# Patient Record
Sex: MALE | Race: WHITE | NOT HISPANIC OR LATINO | Employment: OTHER | URBAN - METROPOLITAN AREA
[De-identification: names, ages, dates, MRNs, and addresses within clinical notes are randomized per-mention and may not be internally consistent; named-entity substitution may affect disease eponyms.]

---

## 2021-03-17 ENCOUNTER — IMMUNIZATION (OUTPATIENT)
Dept: VACCINE CLINIC | Facility: HOSPITAL | Age: 66
End: 2021-03-17

## 2021-03-17 PROCEDURE — 91300 HC SARSCOV02 VAC 30MCG/0.3ML IM: CPT | Performed by: INTERNAL MEDICINE

## 2021-03-17 PROCEDURE — 0001A: CPT | Performed by: INTERNAL MEDICINE

## 2021-04-07 ENCOUNTER — IMMUNIZATION (OUTPATIENT)
Dept: VACCINE CLINIC | Facility: HOSPITAL | Age: 66
End: 2021-04-07

## 2021-04-07 PROCEDURE — 91300 HC SARSCOV02 VAC 30MCG/0.3ML IM: CPT | Performed by: INTERNAL MEDICINE

## 2021-04-07 PROCEDURE — 0002A: CPT | Performed by: INTERNAL MEDICINE

## 2021-06-15 ENCOUNTER — APPOINTMENT (OUTPATIENT)
Dept: CT IMAGING | Facility: HOSPITAL | Age: 66
End: 2021-06-15

## 2021-06-15 ENCOUNTER — HOSPITAL ENCOUNTER (INPATIENT)
Facility: HOSPITAL | Age: 66
LOS: 6 days | Discharge: HOME | End: 2021-06-21
Attending: EMERGENCY MEDICINE | Admitting: FAMILY MEDICINE

## 2021-06-15 DIAGNOSIS — J18.9 PNEUMONIA OF RIGHT LOWER LOBE DUE TO INFECTIOUS ORGANISM: Primary | ICD-10-CM

## 2021-06-15 DIAGNOSIS — J98.4 PULMONARY CAVITARY LESION: ICD-10-CM

## 2021-06-15 PROBLEM — R61 NIGHT SWEATS: Status: ACTIVE | Noted: 2021-06-15

## 2021-06-15 LAB
ALBUMIN SERPL-MCNC: 3.7 G/DL (ref 3.5–5.2)
ALBUMIN/GLOB SERPL: 0.8 G/DL
ALP SERPL-CCNC: 81 U/L (ref 39–117)
ALT SERPL W P-5'-P-CCNC: 20 U/L (ref 1–41)
ANION GAP SERPL CALCULATED.3IONS-SCNC: 14 MMOL/L (ref 5–15)
AST SERPL-CCNC: 31 U/L (ref 1–40)
BASOPHILS # BLD AUTO: 0.05 10*3/MM3 (ref 0–0.2)
BASOPHILS NFR BLD AUTO: 0.4 % (ref 0–1.5)
BILIRUB SERPL-MCNC: 0.3 MG/DL (ref 0–1.2)
BUN SERPL-MCNC: 15 MG/DL (ref 8–23)
BUN/CREAT SERPL: 17 (ref 7–25)
CALCIUM SPEC-SCNC: 9.9 MG/DL (ref 8.6–10.5)
CHLORIDE SERPL-SCNC: 97 MMOL/L (ref 98–107)
CO2 SERPL-SCNC: 22 MMOL/L (ref 22–29)
CREAT SERPL-MCNC: 0.88 MG/DL (ref 0.76–1.27)
D-LACTATE SERPL-SCNC: 1.2 MMOL/L (ref 0.5–2)
DEPRECATED RDW RBC AUTO: 50.9 FL (ref 37–54)
EOSINOPHIL # BLD AUTO: 0.05 10*3/MM3 (ref 0–0.4)
EOSINOPHIL NFR BLD AUTO: 0.4 % (ref 0.3–6.2)
ERYTHROCYTE [DISTWIDTH] IN BLOOD BY AUTOMATED COUNT: 14.1 % (ref 12.3–15.4)
FLUAV SUBTYP SPEC NAA+PROBE: NOT DETECTED
FLUBV RNA ISLT QL NAA+PROBE: NOT DETECTED
GFR SERPL CREATININE-BSD FRML MDRD: 87 ML/MIN/1.73
GLOBULIN UR ELPH-MCNC: 4.4 GM/DL
GLUCOSE SERPL-MCNC: 133 MG/DL (ref 65–99)
HCT VFR BLD AUTO: 39.7 % (ref 37.5–51)
HGB BLD-MCNC: 13 G/DL (ref 13–17.7)
HOLD SPECIMEN: NORMAL
IMM GRANULOCYTES # BLD AUTO: 0.05 10*3/MM3 (ref 0–0.05)
IMM GRANULOCYTES NFR BLD AUTO: 0.4 % (ref 0–0.5)
LYMPHOCYTES # BLD AUTO: 1.1 10*3/MM3 (ref 0.7–3.1)
LYMPHOCYTES NFR BLD AUTO: 9 % (ref 19.6–45.3)
MCH RBC QN AUTO: 31.9 PG (ref 26.6–33)
MCHC RBC AUTO-ENTMCNC: 32.7 G/DL (ref 31.5–35.7)
MCV RBC AUTO: 97.5 FL (ref 79–97)
MONOCYTES # BLD AUTO: 1.3 10*3/MM3 (ref 0.1–0.9)
MONOCYTES NFR BLD AUTO: 10.7 % (ref 5–12)
NEUTROPHILS NFR BLD AUTO: 79.1 % (ref 42.7–76)
NEUTROPHILS NFR BLD AUTO: 9.63 10*3/MM3 (ref 1.7–7)
NRBC BLD AUTO-RTO: 0 /100 WBC (ref 0–0.2)
NT-PROBNP SERPL-MCNC: 165.1 PG/ML (ref 0–900)
PLATELET # BLD AUTO: 490 10*3/MM3 (ref 140–450)
PMV BLD AUTO: 9.6 FL (ref 6–12)
POTASSIUM SERPL-SCNC: 4 MMOL/L (ref 3.5–5.2)
PROCALCITONIN SERPL-MCNC: 0.05 NG/ML (ref 0–0.25)
PROT SERPL-MCNC: 8.1 G/DL (ref 6–8.5)
RBC # BLD AUTO: 4.07 10*6/MM3 (ref 4.14–5.8)
SARS-COV-2 RNA PNL SPEC NAA+PROBE: NOT DETECTED
SODIUM SERPL-SCNC: 133 MMOL/L (ref 136–145)
TROPONIN T SERPL-MCNC: <0.01 NG/ML (ref 0–0.03)
WBC # BLD AUTO: 12.18 10*3/MM3 (ref 3.4–10.8)
WHOLE BLOOD HOLD SPECIMEN: NORMAL

## 2021-06-15 PROCEDURE — 80053 COMPREHEN METABOLIC PANEL: CPT

## 2021-06-15 PROCEDURE — 84484 ASSAY OF TROPONIN QUANT: CPT

## 2021-06-15 PROCEDURE — 87636 SARSCOV2 & INF A&B AMP PRB: CPT | Performed by: EMERGENCY MEDICINE

## 2021-06-15 PROCEDURE — 71275 CT ANGIOGRAPHY CHEST: CPT

## 2021-06-15 PROCEDURE — 85025 COMPLETE CBC W/AUTO DIFF WBC: CPT

## 2021-06-15 PROCEDURE — 83605 ASSAY OF LACTIC ACID: CPT

## 2021-06-15 PROCEDURE — U0004 COV-19 TEST NON-CDC HGH THRU: HCPCS | Performed by: NURSE PRACTITIONER

## 2021-06-15 PROCEDURE — 99223 1ST HOSP IP/OBS HIGH 75: CPT | Performed by: HOSPITALIST

## 2021-06-15 PROCEDURE — 99284 EMERGENCY DEPT VISIT MOD MDM: CPT

## 2021-06-15 PROCEDURE — 87040 BLOOD CULTURE FOR BACTERIA: CPT | Performed by: EMERGENCY MEDICINE

## 2021-06-15 PROCEDURE — 93005 ELECTROCARDIOGRAM TRACING: CPT

## 2021-06-15 PROCEDURE — 84145 PROCALCITONIN (PCT): CPT | Performed by: EMERGENCY MEDICINE

## 2021-06-15 PROCEDURE — 83880 ASSAY OF NATRIURETIC PEPTIDE: CPT

## 2021-06-15 PROCEDURE — 25010000002 CEFTRIAXONE PER 250 MG: Performed by: NURSE PRACTITIONER

## 2021-06-15 PROCEDURE — 0 IOPAMIDOL PER 1 ML: Performed by: EMERGENCY MEDICINE

## 2021-06-15 PROCEDURE — 25010000002 MEROPENEM PER 100 MG: Performed by: HOSPITALIST

## 2021-06-15 PROCEDURE — 93005 ELECTROCARDIOGRAM TRACING: CPT | Performed by: EMERGENCY MEDICINE

## 2021-06-15 RX ORDER — ONDANSETRON 2 MG/ML
4 INJECTION INTRAMUSCULAR; INTRAVENOUS EVERY 6 HOURS PRN
Status: DISCONTINUED | OUTPATIENT
Start: 2021-06-15 | End: 2021-06-21 | Stop reason: HOSPADM

## 2021-06-15 RX ORDER — ACETAMINOPHEN 325 MG/1
650 TABLET ORAL EVERY 6 HOURS PRN
Status: DISCONTINUED | OUTPATIENT
Start: 2021-06-15 | End: 2021-06-21 | Stop reason: HOSPADM

## 2021-06-15 RX ORDER — ONDANSETRON 4 MG/1
4 TABLET, FILM COATED ORAL EVERY 6 HOURS PRN
Status: DISCONTINUED | OUTPATIENT
Start: 2021-06-15 | End: 2021-06-21 | Stop reason: HOSPADM

## 2021-06-15 RX ORDER — SODIUM CHLORIDE 0.9 % (FLUSH) 0.9 %
10 SYRINGE (ML) INJECTION EVERY 12 HOURS SCHEDULED
Status: DISCONTINUED | OUTPATIENT
Start: 2021-06-15 | End: 2021-06-21 | Stop reason: HOSPADM

## 2021-06-15 RX ORDER — ACETAMINOPHEN 500 MG
1000 TABLET ORAL ONCE
Status: COMPLETED | OUTPATIENT
Start: 2021-06-15 | End: 2021-06-15

## 2021-06-15 RX ORDER — SACCHAROMYCES BOULARDII 250 MG
250 CAPSULE ORAL 2 TIMES DAILY
Status: DISCONTINUED | OUTPATIENT
Start: 2021-06-15 | End: 2021-06-21 | Stop reason: HOSPADM

## 2021-06-15 RX ORDER — SODIUM CHLORIDE 0.9 % (FLUSH) 0.9 %
10 SYRINGE (ML) INJECTION AS NEEDED
Status: DISCONTINUED | OUTPATIENT
Start: 2021-06-15 | End: 2021-06-21 | Stop reason: HOSPADM

## 2021-06-15 RX ORDER — GUAIFENESIN 600 MG/1
600 TABLET, EXTENDED RELEASE ORAL EVERY 12 HOURS SCHEDULED
Status: DISCONTINUED | OUTPATIENT
Start: 2021-06-15 | End: 2021-06-21 | Stop reason: HOSPADM

## 2021-06-15 RX ORDER — SODIUM CHLORIDE, SODIUM LACTATE, POTASSIUM CHLORIDE, CALCIUM CHLORIDE 600; 310; 30; 20 MG/100ML; MG/100ML; MG/100ML; MG/100ML
100 INJECTION, SOLUTION INTRAVENOUS CONTINUOUS
Status: DISCONTINUED | OUTPATIENT
Start: 2021-06-15 | End: 2021-06-16

## 2021-06-15 RX ADMIN — SODIUM CHLORIDE 1000 ML: 9 INJECTION, SOLUTION INTRAVENOUS at 18:19

## 2021-06-15 RX ADMIN — IOPAMIDOL 85 ML: 755 INJECTION, SOLUTION INTRAVENOUS at 18:58

## 2021-06-15 RX ADMIN — MEROPENEM 1 G: 1 INJECTION, POWDER, FOR SOLUTION INTRAVENOUS at 23:46

## 2021-06-15 RX ADMIN — ACETAMINOPHEN 1000 MG: 500 TABLET, FILM COATED ORAL at 18:15

## 2021-06-15 RX ADMIN — SODIUM CHLORIDE, POTASSIUM CHLORIDE, SODIUM LACTATE AND CALCIUM CHLORIDE 100 ML/HR: 600; 310; 30; 20 INJECTION, SOLUTION INTRAVENOUS at 23:30

## 2021-06-15 RX ADMIN — SODIUM CHLORIDE 1 G: 900 INJECTION INTRAVENOUS at 21:35

## 2021-06-15 RX ADMIN — SODIUM CHLORIDE, PRESERVATIVE FREE 10 ML: 5 INJECTION INTRAVENOUS at 23:47

## 2021-06-15 RX ADMIN — GUAIFENESIN 600 MG: 600 TABLET, EXTENDED RELEASE ORAL at 23:47

## 2021-06-15 RX ADMIN — Medication 250 MG: at 23:47

## 2021-06-15 NOTE — ED PROVIDER NOTES
Subjective   Shaun Tariq is a 65 yr old male that presents to the ER with complaints of shortness of breath.  Patient explains that his symptoms started approximately 4 days ago.  He experienced cough and congestion at that time.  Patient had been taking multiple over-the-counter medications including Mucinex.  Patient reports fever and chills and generalized malaise.  Patient advises as the time went on his symptoms became more severe.  Patient is here today with a temperature of 101.1.  Patient did receive both of his Covid vaccines.  Patient denies any sick contacts.      History provided by:  Patient   used: No    Shortness of Breath  Severity:  Moderate  Timing:  Constant  Progression:  Worsening  Context: activity, pollens and weather changes    Relieved by:  Nothing  Worsened by:  Deep breathing, coughing and activity  Associated symptoms: cough, fever and sputum production    Associated symptoms: no chest pain and no vomiting        Review of Systems   Constitutional: Positive for chills and fever.   HENT: Positive for congestion, rhinorrhea and sinus pressure.    Respiratory: Positive for cough, sputum production and shortness of breath.    Cardiovascular: Negative for chest pain.   Gastrointestinal: Negative for nausea and vomiting.   Genitourinary: Negative for difficulty urinating and frequency.   Neurological: Negative for dizziness and weakness.   All other systems reviewed and are negative.      History reviewed. No pertinent past medical history.    No Known Allergies    History reviewed. No pertinent surgical history.    History reviewed. No pertinent family history.    Social History     Socioeconomic History   • Marital status:      Spouse name: Not on file   • Number of children: Not on file   • Years of education: Not on file   • Highest education level: Not on file   Tobacco Use   • Smoking status: Former Smoker     Types: Cigarettes   • Smokeless tobacco: Never  Used   Vaping Use   • Vaping Use: Never used   Substance and Sexual Activity   • Alcohol use: Yes   • Drug use: Defer   • Sexual activity: Defer           Objective   Physical Exam  Vitals and nursing note reviewed.   Constitutional:       General: He is not in acute distress.     Appearance: Normal appearance. He is well-developed. He is not ill-appearing or toxic-appearing.   HENT:      Head: Normocephalic and atraumatic.      Mouth/Throat:      Mouth: Mucous membranes are moist.   Eyes:      General: Lids are normal.      Extraocular Movements: Extraocular movements intact.      Conjunctiva/sclera: Conjunctivae normal.      Pupils: Pupils are equal, round, and reactive to light.   Neck:      Trachea: Trachea normal.   Cardiovascular:      Rate and Rhythm: Regular rhythm.      Pulses: Normal pulses.      Heart sounds: Normal heart sounds.   Pulmonary:      Effort: Pulmonary effort is normal. No respiratory distress.      Breath sounds: Normal breath sounds. No decreased breath sounds, wheezing, rhonchi or rales.   Abdominal:      General: Bowel sounds are normal.      Palpations: Abdomen is soft.      Tenderness: There is no abdominal tenderness.   Musculoskeletal:         General: Normal range of motion.      Cervical back: Full passive range of motion without pain and normal range of motion.   Skin:     General: Skin is warm and dry.      Findings: No rash.   Neurological:      Mental Status: He is alert and oriented to person, place, and time.      Cranial Nerves: No cranial nerve deficit.   Psychiatric:         Speech: Speech normal.         Behavior: Behavior normal. Behavior is cooperative.         Procedures           ED Course  ED Course as of Theron 15 2110   Tue Theron 15, 2021   1714 Lactate: 1.2 [KG]   1737 WBC(!): 12.18 [KG]   1930 Patient will be admitted to the hospitalist for further treatment and evaluation.  Patient was started on IV antibiotics.  Dr. Campbell was contacted.  He agrees to admit the  patient.    [KG]      ED Course User Index  [KG] JoseSharon TAMY, APRN                Recent Results (from the past 24 hour(s))   POCT Influenza A/B    Collection Time: 06/15/21  2:00 PM    Specimen: Swab   Result Value Ref Range    Rapid Influenza A Ag Negative Negative    Rapid Influenza B Ag Negative Negative    Internal Control Passed Passed    Lot Number [id number]     Expiration Date 05-07-22    Lactic Acid, Plasma    Collection Time: 06/15/21  3:12 PM    Specimen: Blood   Result Value Ref Range    Lactate 1.2 0.5 - 2.0 mmol/L   Comprehensive Metabolic Panel    Collection Time: 06/15/21  3:12 PM    Specimen: Blood   Result Value Ref Range    Glucose 133 (H) 65 - 99 mg/dL    BUN 15 8 - 23 mg/dL    Creatinine 0.88 0.76 - 1.27 mg/dL    Sodium 133 (L) 136 - 145 mmol/L    Potassium 4.0 3.5 - 5.2 mmol/L    Chloride 97 (L) 98 - 107 mmol/L    CO2 22.0 22.0 - 29.0 mmol/L    Calcium 9.9 8.6 - 10.5 mg/dL    Total Protein 8.1 6.0 - 8.5 g/dL    Albumin 3.70 3.50 - 5.20 g/dL    ALT (SGPT) 20 1 - 41 U/L    AST (SGOT) 31 1 - 40 U/L    Alkaline Phosphatase 81 39 - 117 U/L    Total Bilirubin 0.3 0.0 - 1.2 mg/dL    eGFR Non African Amer 87 >60 mL/min/1.73    Globulin 4.4 gm/dL    A/G Ratio 0.8 g/dL    BUN/Creatinine Ratio 17.0 7.0 - 25.0    Anion Gap 14.0 5.0 - 15.0 mmol/L   BNP    Collection Time: 06/15/21  3:12 PM    Specimen: Blood   Result Value Ref Range    proBNP 165.1 0.0 - 900.0 pg/mL   Troponin    Collection Time: 06/15/21  3:12 PM    Specimen: Blood   Result Value Ref Range    Troponin T <0.010 0.000 - 0.030 ng/mL   Green Top (Gel)    Collection Time: 06/15/21  3:12 PM   Result Value Ref Range    Extra Tube Hold for add-ons.    Lavender Top    Collection Time: 06/15/21  3:12 PM   Result Value Ref Range    Extra Tube hold for add-on    Gold Top - SST    Collection Time: 06/15/21  3:12 PM   Result Value Ref Range    Extra Tube Hold for add-ons.    Gray Top    Collection Time: 06/15/21  3:12 PM   Result Value Ref Range     Extra Tube Hold for add-ons.    CBC Auto Differential    Collection Time: 06/15/21  3:12 PM    Specimen: Blood   Result Value Ref Range    WBC 12.18 (H) 3.40 - 10.80 10*3/mm3    RBC 4.07 (L) 4.14 - 5.80 10*6/mm3    Hemoglobin 13.0 13.0 - 17.7 g/dL    Hematocrit 39.7 37.5 - 51.0 %    MCV 97.5 (H) 79.0 - 97.0 fL    MCH 31.9 26.6 - 33.0 pg    MCHC 32.7 31.5 - 35.7 g/dL    RDW 14.1 12.3 - 15.4 %    RDW-SD 50.9 37.0 - 54.0 fl    MPV 9.6 6.0 - 12.0 fL    Platelets 490 (H) 140 - 450 10*3/mm3    Neutrophil % 79.1 (H) 42.7 - 76.0 %    Lymphocyte % 9.0 (L) 19.6 - 45.3 %    Monocyte % 10.7 5.0 - 12.0 %    Eosinophil % 0.4 0.3 - 6.2 %    Basophil % 0.4 0.0 - 1.5 %    Immature Grans % 0.4 0.0 - 0.5 %    Neutrophils, Absolute 9.63 (H) 1.70 - 7.00 10*3/mm3    Lymphocytes, Absolute 1.10 0.70 - 3.10 10*3/mm3    Monocytes, Absolute 1.30 (H) 0.10 - 0.90 10*3/mm3    Eosinophils, Absolute 0.05 0.00 - 0.40 10*3/mm3    Basophils, Absolute 0.05 0.00 - 0.20 10*3/mm3    Immature Grans, Absolute 0.05 0.00 - 0.05 10*3/mm3    nRBC 0.0 0.0 - 0.2 /100 WBC   Procalcitonin    Collection Time: 06/15/21  3:12 PM    Specimen: Blood   Result Value Ref Range    Procalcitonin 0.05 0.00 - 0.25 ng/mL   COVID-19 and FLU A/B PCR - Swab, Nasopharynx    Collection Time: 06/15/21  5:22 PM    Specimen: Nasopharynx; Swab   Result Value Ref Range    COVID19 Not Detected Not Detected - Ref. Range    Influenza A PCR Not Detected Not Detected    Influenza B PCR Not Detected Not Detected     Note: In addition to lab results from this visit, the labs listed above may include labs taken at another facility or during a different encounter within the last 24 hours. Please correlate lab times with ED admission and discharge times for further clarification of the services performed during this visit.    CT Angiogram Chest   Final Result   Impression:      1. No evidence of pulmonary embolus on this study.      2. 6 cm cavitary lesion in the lateral aspect of the right lower  lobe. The differential diagnosis would be a cavitating pneumonia/abscess versus a cavitating neoplasm.      Signer Name: Thiago Quinn MD    Signed: 6/15/2021 7:17 PM    Workstation Name: [id number]     [facility]        Vitals:    06/15/21 1720 06/15/21 1730 06/15/21 1800 06/15/21 1821   BP: 135/85 122/79 138/89 138/89   BP Location: Right arm   Left arm   Patient Position: Lying   Lying   Pulse: 86 76 80 91   Resp: 18   18   Temp:       TempSrc:       SpO2: 97% 94% 94% 97%   Weight:       Height:         Medications   sodium chloride 0.9 % flush 10 mL (has no administration in time range)   AZITHROMYCIN 500 MG/250 ML 0.9% NS IVPB (vial-mate) (has no administration in time range)   cefTRIAXone (ROCEPHIN) 1 g/100 mL 0.9% NS (MBP) (has no administration in time range)   sodium chloride 0.9 % bolus 1,000 mL (1,000 mL Intravenous New Bag 6/15/21 1819)   acetaminophen (TYLENOL) tablet 1,000 mg (1,000 mg Oral Given 6/15/21 1815)   iopamidol (ISOVUE-370) 76 % injection 100 mL (85 mL Intravenous Given 6/15/21 1858)     ECG/EMG Results (last 24 hours)     Procedure Component Value Units Date/Time    ECG 12 Lead [[id number]] Collected: 06/15/21 1509     Updated: 06/15/21 1509        ECG 12 Lead                                        MDM    Final diagnoses:   Pneumonia of right lower lobe due to infectious organism   Pulmonary cavitary lesion       ED Disposition  ED Disposition     ED Disposition Condition Comment    Decision to Admit  Level of Care: Telemetry [5]   Diagnosis: Pneumonia of right lower lobe due to infectious organism [[id number]]   Admitting Physician: SADIA TRENT [[id number]]   Isolate for COVID?: No [0]   Certification: I Certify That Inpatient Hospital Services Are Medically Necessary For Greater Than 2 Midnights            No follow-up provider specified.       Medication List      No changes were made to your prescriptions during this visit.          Sharon Garcia, APRN  06/15/21  2110

## 2021-06-16 PROBLEM — A41.9 SEPSIS (HCC): Status: ACTIVE | Noted: 2021-06-16

## 2021-06-16 PROBLEM — E87.1 HYPONATREMIA: Status: ACTIVE | Noted: 2021-06-16

## 2021-06-16 LAB
ANION GAP SERPL CALCULATED.3IONS-SCNC: 11 MMOL/L (ref 5–15)
APTT PPP: 33.3 SECONDS (ref 22–39)
BUN SERPL-MCNC: 14 MG/DL (ref 8–23)
BUN/CREAT SERPL: 19.4 (ref 7–25)
CALCIUM SPEC-SCNC: 8.7 MG/DL (ref 8.6–10.5)
CHLORIDE SERPL-SCNC: 98 MMOL/L (ref 98–107)
CO2 SERPL-SCNC: 22 MMOL/L (ref 22–29)
CREAT SERPL-MCNC: 0.72 MG/DL (ref 0.76–1.27)
DEPRECATED RDW RBC AUTO: 52.1 FL (ref 37–54)
ERYTHROCYTE [DISTWIDTH] IN BLOOD BY AUTOMATED COUNT: 14.3 % (ref 12.3–15.4)
GFR SERPL CREATININE-BSD FRML MDRD: 110 ML/MIN/1.73
GLUCOSE BLDC GLUCOMTR-MCNC: 131 MG/DL (ref 70–130)
GLUCOSE SERPL-MCNC: 91 MG/DL (ref 65–99)
HCT VFR BLD AUTO: 35.1 % (ref 37.5–51)
HGB BLD-MCNC: 10.9 G/DL (ref 13–17.7)
HIV1+2 AB SER QL: NORMAL
INR PPP: 1.11 (ref 0.85–1.16)
L PNEUMO1 AG UR QL IA: NEGATIVE
MCH RBC QN AUTO: 30.7 PG (ref 26.6–33)
MCHC RBC AUTO-ENTMCNC: 31.1 G/DL (ref 31.5–35.7)
MCV RBC AUTO: 98.9 FL (ref 79–97)
MRSA DNA SPEC QL NAA+PROBE: NEGATIVE
PLATELET # BLD AUTO: 475 10*3/MM3 (ref 140–450)
PMV BLD AUTO: 9.8 FL (ref 6–12)
POTASSIUM SERPL-SCNC: 3.6 MMOL/L (ref 3.5–5.2)
PROTHROMBIN TIME: 14 SECONDS (ref 11.4–14.4)
RBC # BLD AUTO: 3.55 10*6/MM3 (ref 4.14–5.8)
S PNEUM AG SPEC QL LA: NEGATIVE
SODIUM SERPL-SCNC: 131 MMOL/L (ref 136–145)
SODIUM SERPL-SCNC: 133 MMOL/L (ref 136–145)
WBC # BLD AUTO: 10.59 10*3/MM3 (ref 3.4–10.8)

## 2021-06-16 PROCEDURE — 86612 BLASTOMYCES ANTIBODY: CPT | Performed by: INTERNAL MEDICINE

## 2021-06-16 PROCEDURE — 85027 COMPLETE CBC AUTOMATED: CPT | Performed by: HOSPITALIST

## 2021-06-16 PROCEDURE — 87305 ASPERGILLUS AG IA: CPT | Performed by: INTERNAL MEDICINE

## 2021-06-16 PROCEDURE — 87899 AGENT NOS ASSAY W/OPTIC: CPT | Performed by: INTERNAL MEDICINE

## 2021-06-16 PROCEDURE — 25010000002 MEROPENEM PER 100 MG: Performed by: HOSPITALIST

## 2021-06-16 PROCEDURE — 82962 GLUCOSE BLOOD TEST: CPT

## 2021-06-16 PROCEDURE — 87449 NOS EACH ORGANISM AG IA: CPT | Performed by: INTERNAL MEDICINE

## 2021-06-16 PROCEDURE — 85610 PROTHROMBIN TIME: CPT | Performed by: STUDENT IN AN ORGANIZED HEALTH CARE EDUCATION/TRAINING PROGRAM

## 2021-06-16 PROCEDURE — 85730 THROMBOPLASTIN TIME PARTIAL: CPT | Performed by: STUDENT IN AN ORGANIZED HEALTH CARE EDUCATION/TRAINING PROGRAM

## 2021-06-16 PROCEDURE — 87385 HISTOPLASMA CAPSUL AG IA: CPT | Performed by: INTERNAL MEDICINE

## 2021-06-16 PROCEDURE — 87070 CULTURE OTHR SPECIMN AEROBIC: CPT | Performed by: INTERNAL MEDICINE

## 2021-06-16 PROCEDURE — 84295 ASSAY OF SERUM SODIUM: CPT | Performed by: INTERNAL MEDICINE

## 2021-06-16 PROCEDURE — 87103 BLOOD FUNGUS CULTURE: CPT | Performed by: INTERNAL MEDICINE

## 2021-06-16 PROCEDURE — 25010000002 PIPERACILLIN SOD-TAZOBACTAM PER 1 G: Performed by: INTERNAL MEDICINE

## 2021-06-16 PROCEDURE — G0432 EIA HIV-1/HIV-2 SCREEN: HCPCS | Performed by: INTERNAL MEDICINE

## 2021-06-16 PROCEDURE — 25010000002 ONDANSETRON PER 1 MG: Performed by: HOSPITALIST

## 2021-06-16 PROCEDURE — 99232 SBSQ HOSP IP/OBS MODERATE 35: CPT | Performed by: INTERNAL MEDICINE

## 2021-06-16 PROCEDURE — 86698 HISTOPLASMA ANTIBODY: CPT | Performed by: INTERNAL MEDICINE

## 2021-06-16 PROCEDURE — 86606 ASPERGILLUS ANTIBODY: CPT | Performed by: INTERNAL MEDICINE

## 2021-06-16 PROCEDURE — 87641 MR-STAPH DNA AMP PROBE: CPT | Performed by: INTERNAL MEDICINE

## 2021-06-16 PROCEDURE — 87206 SMEAR FLUORESCENT/ACID STAI: CPT | Performed by: INTERNAL MEDICINE

## 2021-06-16 PROCEDURE — 86480 TB TEST CELL IMMUN MEASURE: CPT | Performed by: INTERNAL MEDICINE

## 2021-06-16 PROCEDURE — 80048 BASIC METABOLIC PNL TOTAL CA: CPT | Performed by: HOSPITALIST

## 2021-06-16 PROCEDURE — 87205 SMEAR GRAM STAIN: CPT | Performed by: INTERNAL MEDICINE

## 2021-06-16 PROCEDURE — 87102 FUNGUS ISOLATION CULTURE: CPT | Performed by: INTERNAL MEDICINE

## 2021-06-16 PROCEDURE — 87116 MYCOBACTERIA CULTURE: CPT | Performed by: INTERNAL MEDICINE

## 2021-06-16 RX ORDER — LINEZOLID 600 MG/1
600 TABLET, FILM COATED ORAL EVERY 12 HOURS SCHEDULED
Status: DISCONTINUED | OUTPATIENT
Start: 2021-06-16 | End: 2021-06-18

## 2021-06-16 RX ORDER — SODIUM CHLORIDE 9 MG/ML
75 INJECTION, SOLUTION INTRAVENOUS CONTINUOUS
Status: ACTIVE | OUTPATIENT
Start: 2021-06-16 | End: 2021-06-16

## 2021-06-16 RX ORDER — L.ACID,PARA/B.BIFIDUM/S.THERM 8B CELL
1 CAPSULE ORAL DAILY
Status: DISCONTINUED | OUTPATIENT
Start: 2021-06-16 | End: 2021-06-21 | Stop reason: HOSPADM

## 2021-06-16 RX ORDER — ALBUTEROL SULFATE 2.5 MG/3ML
2.5 SOLUTION RESPIRATORY (INHALATION)
Status: DISPENSED | OUTPATIENT
Start: 2021-06-16 | End: 2021-06-16

## 2021-06-16 RX ORDER — SODIUM CHLORIDE FOR INHALATION 7 %
4 VIAL, NEBULIZER (ML) INHALATION
Status: DISPENSED | OUTPATIENT
Start: 2021-06-16 | End: 2021-06-16

## 2021-06-16 RX ADMIN — SODIUM CHLORIDE, PRESERVATIVE FREE 10 ML: 5 INJECTION INTRAVENOUS at 09:07

## 2021-06-16 RX ADMIN — GUAIFENESIN 600 MG: 600 TABLET, EXTENDED RELEASE ORAL at 22:54

## 2021-06-16 RX ADMIN — SODIUM CHLORIDE 75 ML/HR: 9 INJECTION, SOLUTION INTRAVENOUS at 09:07

## 2021-06-16 RX ADMIN — ONDANSETRON 4 MG: 2 INJECTION INTRAMUSCULAR; INTRAVENOUS at 13:04

## 2021-06-16 RX ADMIN — Medication 250 MG: at 22:55

## 2021-06-16 RX ADMIN — Medication 250 MG: at 09:05

## 2021-06-16 RX ADMIN — ACETAMINOPHEN 650 MG: 325 TABLET ORAL at 05:54

## 2021-06-16 RX ADMIN — GUAIFENESIN 600 MG: 600 TABLET, EXTENDED RELEASE ORAL at 09:05

## 2021-06-16 RX ADMIN — HYDROCODONE POLISTIREX AND CHLORPHENIRAMINE POLISTIREX 2.5 ML: 10; 8 SUSPENSION, EXTENDED RELEASE ORAL at 18:09

## 2021-06-16 RX ADMIN — LINEZOLID 600 MG: 600 TABLET, FILM COATED ORAL at 11:27

## 2021-06-16 RX ADMIN — LINEZOLID 600 MG: 600 TABLET, FILM COATED ORAL at 22:54

## 2021-06-16 RX ADMIN — TAZOBACTAM SODIUM AND PIPERACILLIN SODIUM 3.38 G: 375; 3 INJECTION, SOLUTION INTRAVENOUS at 18:09

## 2021-06-16 RX ADMIN — TAZOBACTAM SODIUM AND PIPERACILLIN SODIUM 3.38 G: 375; 3 INJECTION, SOLUTION INTRAVENOUS at 22:53

## 2021-06-16 RX ADMIN — Medication 1 CAPSULE: at 11:27

## 2021-06-16 RX ADMIN — MEROPENEM 1 G: 1 INJECTION, POWDER, FOR SOLUTION INTRAVENOUS at 05:54

## 2021-06-16 NOTE — PLAN OF CARE
Goal Outcome Evaluation:  Plan of Care Reviewed With: patient        Progress: no change  Outcome Summary: Patient admitted from Ed with dx of pneumonia. Iv abx infusing. Patient is running a low grade temp. Will continue to monitor.

## 2021-06-16 NOTE — H&P
[facility] Medicine Services  PROGRESS NOTE    Patient Name: Shaun Tariq  : 1955  MRN: [id number]    Date of Admission: 6/15/2021  Primary Care Physician: Provider, No Known    Subjective   Subjective     CC:  Dyspnea    HPI:  This is a 65 year old former smoker with progressive dyspnea/cough/congestion for 4 days. He has been taking over the counter decongestants without improvement. He notes f/c/sweats and has had night sweats for several months. Denies weight loss. Denies aspiration event or choking spell.     Review of Systems   Constitutional: Positive for activity change, chills, diaphoresis, fatigue and fever.   HENT: Positive for congestion.    Respiratory: Positive for cough, chest tightness and shortness of breath.    Cardiovascular: Negative for chest pain, palpitations and leg swelling.   Gastrointestinal: Positive for nausea and vomiting.   Genitourinary: Negative.    Musculoskeletal: Negative.    Neurological: Negative.    Psychiatric/Behavioral: Negative.      Objective   Objective     Vital Signs:   Temp:  [101.1 °F (38.4 °C)-102 °F (38.9 °C)] 101.1 °F (38.4 °C)  Heart Rate:  [] 91  Resp:  [18-26] 18  BP: (122-173)/() 138/89        Physical Exam:  NAD, alert and oriented x 3  OP clear, MMM  PERRL  Neck supple  No LAD  RRR  Decreased R base, otherwise clear  +BS, ND, NT, soft  ENGLISH  Normal affect  No obvious rashes    Results Reviewed:  Results from last 7 days   Lab Units 06/15/21  1512   WBC 10*3/mm3 12.18*   HEMOGLOBIN g/dL 13.0   HEMATOCRIT % 39.7   PLATELETS 10*3/mm3 490*   PROCALCITONIN ng/mL 0.05     Results from last 7 days   Lab Units 06/15/21  1512   SODIUM mmol/L 133*   POTASSIUM mmol/L 4.0   CHLORIDE mmol/L 97*   CO2 mmol/L 22.0   BUN mg/dL 15   CREATININE mg/dL 0.88   GLUCOSE mg/dL 133*   CALCIUM mg/dL 9.9   ALT (SGPT) U/L 20   AST (SGOT) U/L 31   TROPONIN T ng/mL <0.010   PROBNP pg/mL 165.1     Estimated Creatinine Clearance: 93.9 mL/min  (by C-G formula based on SCr of 0.88 mg/dL).    Microbiology Results Abnormal     Procedure Component Value - Date/Time    COVID-19 and FLU A/B PCR - Swab, Nasopharynx [[id number]]  (Normal) Collected: 06/15/21 1722    Lab Status: Final result Specimen: Swab from Nasopharynx Updated: 06/15/21 1801     COVID19 Not Detected     Influenza A PCR Not Detected     Influenza B PCR Not Detected    Narrative:      Fact sheet for providers: https://www.fda.gov/media/610804/download    Fact sheet for patients: https://www.fda.gov/media/627358/download    Test performed by PCR.          Imaging Results (Last 24 Hours)     Procedure Component Value Units Date/Time    CT Angiogram Chest [[id number]] Collected: 06/15/21 1917     Updated: 06/15/21 1919    Narrative:      CTA Chest    INDICATION:   Fever and shortness of breath. COVID positive.    TECHNIQUE:   CT angiogram of the chest with 100 cc of IV contrast. 3-D reconstructions were obtained and reviewed.   Radiation dose reduction techniques included automated exposure control or exposure modulation based on body size. Count of known CT and cardiac nuc  med studies performed in previous 12 months: 0.     COMPARISON:   None available.    FINDINGS:   Contrast timing is appropriate for adequate sensitivity.     The main pulmonary trunk is of normal caliber. No filling defects in the central, lobar, or segmental pulmonary arteries. No interventricular septal bowing or hepatic reflux of contrast to suggest right heart strain.    Heart size is normal. No pericardial effusion. The aorta is non aneurysmal without evidence of dissection.    Central airways are patent. No endobronchial lesions.    There is a 6 cm cavitary lesion in the lateral aspect of the right lower lobe. No threshold enlarged mediastinal, hilar or axillary lymph nodes.    No acute findings in visualized upper abdomen.     Regional osseous structures show no acute or aggressive bone lesions.      Impression:       Impression:    1. No evidence of pulmonary embolus on this study.    2. 6 cm cavitary lesion in the lateral aspect of the right lower lobe. The differential diagnosis would be a cavitating pneumonia/abscess versus a cavitating neoplasm.    Signer Name: Thiago Quinn MD   Signed: 6/15/2021 7:17 PM   Workstation Name: [id number]    [facility]              I have reviewed the medications:  Scheduled Meds:azithromycin, 500 mg, Intravenous, Once  cefTRIAXone, 1 g, Intravenous, Once      Continuous Infusions:   PRN Meds:.sodium chloride    Assessment/Plan   Assessment & Plan     Active Hospital Problems    Diagnosis  POA   • **Pneumonia of right lower lobe due to infectious organism [J18.9]  Yes   • Night sweats [R61]  Unknown      Resolved Hospital Problems   No resolved problems to display.        Brief Hospital Course to date:  Shaun Tariq is a 65 y.o. male here with pneumonia, with cavitary PNA on CT    PNA/Cavitary PNA  --denies aspiration  --sputum culture  --merrem  --flutter valve/mucinex  --ID consult    Night sweats    Volume depletion  --IVF    Mild Hyponatremia    DVT prophylaxis:  TREVER      Disposition: I expect the patient to be discharged TBD.    CODE STATUS:   Code Status and Medical Interventions:   Ordered at: 06/15/21 2014     Code Status:    CPR     Medical Interventions (Level of Support Prior to Arrest):    Full       Torrey Campbell MD  06/15/21

## 2021-06-16 NOTE — CONSULTS
Infectious Disease Consult  Shaun Tariq  1955  [id number]    Date of Consult: 6/16/2021  Admission Date: 6/15/2021    Requesting Provider: Dr Campbell  Evaluating Physician: Myles Musa MD    Chief Complaint: cough, dyspnea    Reason for Consultation: cavitary lung lesion    History of present illness:   Patient is a 65 y.o.  Yr old male with history of ongoing alcohol abuse (5-7 beers daily), and 30 pack year smoking history.  He presented to urgent care complaining of 4 days progress cough, sputum production and fever to 102. He complains of drenching night sweats for 3 months.  No weight loss admitted and started on meropenem. Febrile to 101.1 here. Not hypoxic on room air. COVID and flu negative. WBC 12. Normal lactate, procal.  Patient denies any known exposures to tuberculosis.  No history of international travel,  service, homelessness or incarceration.  He does have a pet dog but is not around any other animals including birds.  He has lived in [place] nearly his whole life.  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  He complains of poor appetite, nausea and emesis    Review of Systems  Constitutional--  See above  Heent-- No new vision, hearing or throat complaints.  No epistaxis or oral sores.  Denies odynophagia or dysphagia.  No headache, photophobia or neck stiffness.  CV-- No chest pain, palpitation or syncope  Resp--significant cough but it is nonproductive  GI-emesis and nausea  -- No dysuria, hematuria, or flank pain.  Denies hesitancy, urgency or flank pain.  Lymph- no swollen lymph nodes in neck/axilla or groin.   Heme- No active bruising or bleeding  MS-- no swelling or pain in the bones or joints of arms/legs.  No new back pain.  Neuro-- No acute focal weakness or numbness in the arms or legs.  Skin-- No rashes, lesions, ulcers. No skin breakdown      Past Medical History:   Diagnosis Date   • Alcohol abuse    • Former smoker         History reviewed. No pertinent surgical history.    Social History     Socioeconomic History   • Marital status:      Spouse name: Not on file   • Number of children: Not on file   • Years of education: Not on file   • Highest education level: Not on file   Tobacco Use   • Smoking status: Former Smoker     Types: Cigarettes   • Smokeless tobacco: Never Used   Vaping Use   • Vaping Use: Never used   Substance and Sexual Activity   • Alcohol use: Yes   • Drug use: Defer   • Sexual activity: Defer     Family history: No family history of tuberculosis    No Known Allergies    Antibiotics:  Anti-Infectives (From admission, onward)    Ordered     Dose/Rate Route Frequency Start Stop    06/15/21 2252  meropenem (MERREM) 1 g/100 mL 0.9% NS VTB (mbp)     Ordering Provider: Torrey Campbell MD    1 g  over 3 Hours Intravenous Every 8 Hours 06/16/21 0600 06/23/21 0559    06/15/21 2252  meropenem (MERREM) 1 g/100 mL 0.9% NS VTB (mbp)     Ordering Provider: Torrey Campbell MD    1 g  over 30 Minutes Intravenous Once 06/15/21 2345 06/16/21 0016    06/15/21 1932  cefTRIAXone (ROCEPHIN) 1 g/100 mL 0.9% NS (MBP)     Ordering Provider: Sharon Garcia APRN    1 g  over 30 Minutes Intravenous Once 06/15/21 1934 06/15/21 2205    06/15/21 1932  AZITHROMYCIN 500 MG/250 ML 0.9% NS IVPB (vial-mate)     Ordering Provider: Torrey Campbell MD    500 mg  over 60 Minutes Intravenous Once 06/15/21 1933            Other Medications:  Current Facility-Administered Medications   Medication Dose Route Frequency Provider Last Rate Last Admin   • acetaminophen (TYLENOL) tablet 650 mg  650 mg Oral Q6H PRN Torrey Campbell MD   650 mg at 06/16/21 0554   • AZITHROMYCIN 500 MG/250 ML 0.9% NS IVPB (vial-mate)  500 mg Intravenous Once Torrey Campbell MD       • guaiFENesin (MUCINEX) 12 hr tablet 600 mg  600 mg Oral Q12H Torrey Campbell MD   600 mg at 06/16/21 0905   • meropenem (MERREM) 1 g/100 mL 0.9% NS VTB (mbp)  1 g Intravenous Q8H  "Torrey Campbell MD   1 g at 06/16/21 0554   • ondansetron (ZOFRAN) tablet 4 mg  4 mg Oral Q6H PRN Torrey Campbell MD        Or   • ondansetron (ZOFRAN) injection 4 mg  4 mg Intravenous Q6H PRN Torrey Campbell MD       • saccharomyces boulardii (FLORASTOR) capsule 250 mg  250 mg Oral BID Torrey Campbell MD   250 mg at 06/16/21 0905   • sodium chloride 0.9 % flush 10 mL  10 mL Intravenous PRN Torrey Campbell MD       • sodium chloride 0.9 % flush 10 mL  10 mL Intravenous Q12H Torrey Campbell MD   10 mL at 06/16/21 0907   • sodium chloride 0.9 % flush 10 mL  10 mL Intravenous PRN Torrey Campbell MD       • sodium chloride 0.9 % infusion  75 mL/hr Intravenous Continuous Rosibel Renee, DO 75 mL/hr at 06/16/21 0907 75 mL/hr at 06/16/21 0907       Physical Exam:   Vital Signs   /80 (BP Location: Left arm, Patient Position: Lying)   Pulse 82   Temp 98.2 °F (36.8 °C) (Oral)   Resp 18   Ht 172.7 cm (68\")   Wt 90.7 kg (200 lb)   SpO2 95%   BMI 30.41 kg/m²     GENERAL: Awake and alert, reasonably comfortable appearing  HEENT: Normocephalic, atraumatic.  EOMI. No conjunctival injection. No icterus. Oropharynx clear without evidence of thrush or exudate.  Fair dentition  NECK: Supple without nuchal rigidity. No mass.  LYMPH: No cervical, axillary lymphadenopathy.  HEART: RRR; No murmur.  LUNGS: Diminished at right base.  Dry cough  ABDOMEN: Soft, nontender, nondistended. Obese. No rebound or guarding.  EXT:  No cyanosis, clubbing or edema.  : Without Yanes catheter.  MSK: FROM without joint effusions noted arms/legs.    SKIN: Warm and dry without cutaneous eruptions on Inspection/palpation.    NEURO: Oriented to PPT. No focal deficits on motor/sensory exam at arms/legs.  PSYCHIATRIC: Normal insight and judgement. Cooperative with PE    Laboratory Data    Results from last 7 days   Lab Units 06/16/21  0455 06/15/21  1512   WBC 10*3/mm3 10.59 12.18*   HEMOGLOBIN g/dL 10.9* 13.0   HEMATOCRIT % 35.1* 39.7 "   PLATELETS 10*3/mm3 475* 490*     Results from last 7 days   Lab Units 06/16/21  0455   SODIUM mmol/L 131*   POTASSIUM mmol/L 3.6   CHLORIDE mmol/L 98   CO2 mmol/L 22.0   BUN mg/dL 14   CREATININE mg/dL 0.72*   GLUCOSE mg/dL 91   CALCIUM mg/dL 8.7     Estimated Creatinine Clearance: 114.8 mL/min (A) (by C-G formula based on SCr of 0.72 mg/dL (L)).  Results from last 7 days   Lab Units 06/15/21  1512   ALK PHOS U/L 81   BILIRUBIN mg/dL 0.3   ALT (SGPT) U/L 20   AST (SGOT) U/L 31               Microbiology:  COVID and flu negative  Blood cx pending     Radiology:  XR Chest 2 View    Result Date: 6/15/2021  Findings concerning for cavitating pneumonia in the right midlung.  This report was finalized on 6/15/2021 2:04 PM by Linden Real.      CT Angiogram Chest    Result Date: 6/15/2021  Impression: 1. No evidence of pulmonary embolus on this study. 2. 6 cm cavitary lesion in the lateral aspect of the right lower lobe. The differential diagnosis would be a cavitating pneumonia/abscess versus a cavitating neoplasm. Signer Name: Thiago Quinn MD  Signed: 6/15/2021 7:17 PM  Workstation Name: [id number]  [facility] personally reviewed the above CT images. Large thick walled RLL cavitary lesion. No other focal infiltrate      Impression:   1. Right lower lobe cavitary lesion: 6 cm on CT. Thick walled.  Differential includes bacterial pneumonia, fungal pneumonia, tuberculosis, malignancy.  Could be malignancy with secondary infection. less likely vasculitis with his age but not impossible.  No specific risk factors for tuberculosis identified but with his history of several months of night sweats and his imaging findings we certainly need to rule it out.  2. Fever, sepsis, leukocytosis   3. Night sweats  4. Former smoker  5. Ongoing alcohol abuse    PLAN:   - Airborne isolation for possible tuberculosis until adequately screened with 3 negative AFB smears or 1 BAL specimen  - 3 AFB sputum  smears  by at least 8 hours.  Respiratory therapy consulted for induced sputum.  If unable to produce adequate specimen even with hypertonic saline will need pulmonary consultation to consider bronchoscopy  - TB QuantiFERON  - HIV screening  - Bacterial and fungal sputum cultures  - Fungal work-up including aspergillosis, histoplasmosis and blastomycosis antigens and antibodies  - MRSA nares screening  - Follow-up pending strep and Legionella urine antigens  - Follow-up pending blood cultures    - Once tuberculosis has been adequately screened for will need CT-guided biopsy to obtain tissue cultures and specimen for pathology to screen for malignancy    - Stop meropenem  - Linezolid 600 mg twice daily until MRSA ruled out  - Start Zosyn  - probiotic  - Consider empiric itraconazole if no improvement in fevers in the next 48 hrs    Highly complex MDM. Discussed with Dr Renee. I will follow    Myles Musa MD  6/16/2021

## 2021-06-16 NOTE — PROGRESS NOTES
[facility] Medicine Services  PROGRESS NOTE    Patient Name: Shaun Tariq  : 1955  MRN: [id number]    Date of Admission: 6/15/2021  Primary Care Physician: Provider, No Known    Subjective   Subjective     CC:  Cough, night sweats     HPI:  No acute events but states his cough is very severe.  He has been coughing for about a week and that has been intolerable.  Reviewed current plans and antibiotics.  All questions answered to the best my ability    ROS:  Gen- No fevers, chills  CV- No chest pain, palpitations  Resp- + cough, dyspnea  GI- No N/V/D, abd pain    Objective   Objective     Vital Signs:   Temp:  [98.1 °F (36.7 °C)-102 °F (38.9 °C)] 100.1 °F (37.8 °C)  Heart Rate:  [] 84  Resp:  [18-26] 18  BP: (122-173)/() 158/92        Physical Exam:  Constitutional: No acute distress, awake, alert  HENT: NCAT, mucous membranes moist  Respiratory: Clear to auscultation bilaterally, respiratory effort normal; persistent dry cough    Cardiovascular: RRR, no murmurs, rubs, or gallops  Gastrointestinal: Positive bowel sounds, soft, nontender, nondistended  Musculoskeletal: No bilateral ankle edema  Psychiatric: Appropriate affect, cooperative  Neurologic: Oriented x 3, strength symmetric in all extremities, Cranial Nerves grossly intact to confrontation, speech clear  Skin: No rashes    Results Reviewed:  Results from last 7 days   Lab Units 21  0455 06/15/21  1512   WBC 10*3/mm3 10.59 12.18*   HEMOGLOBIN g/dL 10.9* 13.0   HEMATOCRIT % 35.1* 39.7   PLATELETS 10*3/mm3 475* 490*   PROCALCITONIN ng/mL  --  0.05     Results from last 7 days   Lab Units 21  0455 06/15/21  1512   SODIUM mmol/L 131* 133*   POTASSIUM mmol/L 3.6 4.0   CHLORIDE mmol/L 98 97*   CO2 mmol/L 22.0 22.0   BUN mg/dL 14 15   CREATININE mg/dL 0.72* 0.88   GLUCOSE mg/dL 91 133*   CALCIUM mg/dL 8.7 9.9   ALT (SGPT) U/L  --  20   AST (SGOT) U/L  --  31   TROPONIN T ng/mL  --  <0.010   PROBNP pg/mL   --  165.1     Estimated Creatinine Clearance: 114.8 mL/min (A) (by C-G formula based on SCr of 0.72 mg/dL (L)).    Microbiology Results Abnormal     Procedure Component Value - Date/Time    COVID-19 and FLU A/B PCR - Swab, Nasopharynx [[id number]]  (Normal) Collected: 06/15/21 1722    Lab Status: Final result Specimen: Swab from Nasopharynx Updated: 06/15/21 1801     COVID19 Not Detected     Influenza A PCR Not Detected     Influenza B PCR Not Detected    Narrative:      Fact sheet for providers: https://www.fda.gov/media/450150/download    Fact sheet for patients: https://www.fda.gov/media/165363/download    Test performed by PCR.          Imaging Results (Last 24 Hours)     Procedure Component Value Units Date/Time    CT Angiogram Chest [[id number]] Collected: 06/15/21 1917     Updated: 06/15/21 1919    Narrative:      CTA Chest    INDICATION:   Fever and shortness of breath. COVID positive.    TECHNIQUE:   CT angiogram of the chest with 100 cc of IV contrast. 3-D reconstructions were obtained and reviewed.   Radiation dose reduction techniques included automated exposure control or exposure modulation based on body size. Count of known CT and cardiac nuc  med studies performed in previous 12 months: 0.     COMPARISON:   None available.    FINDINGS:   Contrast timing is appropriate for adequate sensitivity.     The main pulmonary trunk is of normal caliber. No filling defects in the central, lobar, or segmental pulmonary arteries. No interventricular septal bowing or hepatic reflux of contrast to suggest right heart strain.    Heart size is normal. No pericardial effusion. The aorta is non aneurysmal without evidence of dissection.    Central airways are patent. No endobronchial lesions.    There is a 6 cm cavitary lesion in the lateral aspect of the right lower lobe. No threshold enlarged mediastinal, hilar or axillary lymph nodes.    No acute findings in visualized upper abdomen.     Regional osseous structures  show no acute or aggressive bone lesions.      Impression:      Impression:    1. No evidence of pulmonary embolus on this study.    2. 6 cm cavitary lesion in the lateral aspect of the right lower lobe. The differential diagnosis would be a cavitating pneumonia/abscess versus a cavitating neoplasm.    Signer Name: Thiago Quinn MD   Signed: 6/15/2021 7:17 PM   Workstation Name: [facility]    [facility] [place]              I have reviewed the medications:  Scheduled Meds:azithromycin, 500 mg, Intravenous, Once  guaiFENesin, 600 mg, Oral, Q12H  meropenem, 1 g, Intravenous, Q8H  saccharomyces boulardii, 250 mg, Oral, BID  sodium chloride, 10 mL, Intravenous, Q12H      Continuous Infusions:lactated ringers, 100 mL/hr, Last Rate: 100 mL/hr (06/15/21 6300)      PRN Meds:.•  acetaminophen  •  ondansetron **OR** ondansetron  •  sodium chloride  •  sodium chloride    Assessment/Plan   Assessment & Plan     Active Hospital Problems    Diagnosis  POA   • **Pneumonia of right lower lobe due to infectious organism [J18.9]  Yes   • Night sweats [R61]  Unknown      Resolved Hospital Problems   No resolved problems to display.        Brief Hospital Course to date:  Shaun Tariq is a 65 y.o. male with no past medical history on no medications who presented with progressive SOA/cough x 4 days with night sweats for several months.     Sepsis - fever, WBC, source (POA)  PNA/cavitary lung lesion   Night sweats   Fever   - CTA with 6 cm cavitary lesion in lateral right lung   - likely infectious with other symptoms but neoplasm can't be ruled out   - ID consulted   - rule out TB -- airborne isolation and relayed this to charge nurse  - AFB x 3  - Continue azithromycin zyvox and zosyn   - PRN cough medication     Mild hyponatremia   - Likely related to volume depletion  - continue fluids; repeat Na this afternoon     DVT prophylaxis:  Mechanical DVT prophylaxis orders are present.       Disposition: I expect the  patient to be discharged TBD.    CODE STATUS:   Code Status and Medical Interventions:   Ordered at: 06/15/21 2014     Code Status:    CPR     Medical Interventions (Level of Support Prior to Arrest):    Full       Rosibel Renee DO  06/16/21

## 2021-06-17 PROBLEM — D75.839 THROMBOCYTOSIS: Status: ACTIVE | Noted: 2021-06-17

## 2021-06-17 LAB
ANION GAP SERPL CALCULATED.3IONS-SCNC: 9 MMOL/L (ref 5–15)
BUN SERPL-MCNC: 9 MG/DL (ref 8–23)
BUN/CREAT SERPL: 10 (ref 7–25)
CALCIUM SPEC-SCNC: 8.9 MG/DL (ref 8.6–10.5)
CHLORIDE SERPL-SCNC: 101 MMOL/L (ref 98–107)
CO2 SERPL-SCNC: 26 MMOL/L (ref 22–29)
CREAT SERPL-MCNC: 0.9 MG/DL (ref 0.76–1.27)
DEPRECATED RDW RBC AUTO: 51.7 FL (ref 37–54)
ERYTHROCYTE [DISTWIDTH] IN BLOOD BY AUTOMATED COUNT: 14.2 % (ref 12.3–15.4)
GFR SERPL CREATININE-BSD FRML MDRD: 85 ML/MIN/1.73
GLUCOSE SERPL-MCNC: 97 MG/DL (ref 65–99)
HCT VFR BLD AUTO: 35.5 % (ref 37.5–51)
HGB BLD-MCNC: 11.4 G/DL (ref 13–17.7)
MCH RBC QN AUTO: 32 PG (ref 26.6–33)
MCHC RBC AUTO-ENTMCNC: 32.1 G/DL (ref 31.5–35.7)
MCV RBC AUTO: 99.7 FL (ref 79–97)
PLATELET # BLD AUTO: 484 10*3/MM3 (ref 140–450)
PMV BLD AUTO: 9.8 FL (ref 6–12)
POTASSIUM SERPL-SCNC: 4 MMOL/L (ref 3.5–5.2)
RBC # BLD AUTO: 3.56 10*6/MM3 (ref 4.14–5.8)
SODIUM SERPL-SCNC: 136 MMOL/L (ref 136–145)
WBC # BLD AUTO: 9.12 10*3/MM3 (ref 3.4–10.8)

## 2021-06-17 PROCEDURE — 87206 SMEAR FLUORESCENT/ACID STAI: CPT | Performed by: INTERNAL MEDICINE

## 2021-06-17 PROCEDURE — 87116 MYCOBACTERIA CULTURE: CPT | Performed by: INTERNAL MEDICINE

## 2021-06-17 PROCEDURE — 99232 SBSQ HOSP IP/OBS MODERATE 35: CPT | Performed by: INTERNAL MEDICINE

## 2021-06-17 PROCEDURE — 25010000002 PIPERACILLIN SOD-TAZOBACTAM PER 1 G: Performed by: INTERNAL MEDICINE

## 2021-06-17 PROCEDURE — 80048 BASIC METABOLIC PNL TOTAL CA: CPT | Performed by: INTERNAL MEDICINE

## 2021-06-17 PROCEDURE — 85027 COMPLETE CBC AUTOMATED: CPT | Performed by: INTERNAL MEDICINE

## 2021-06-17 RX ORDER — SODIUM CHLORIDE FOR INHALATION 7 %
4 VIAL, NEBULIZER (ML) INHALATION
Status: DISPENSED | OUTPATIENT
Start: 2021-06-18 | End: 2021-06-18

## 2021-06-17 RX ORDER — LORAZEPAM 1 MG/1
2 TABLET ORAL
Status: DISCONTINUED | OUTPATIENT
Start: 2021-06-17 | End: 2021-06-21 | Stop reason: HOSPADM

## 2021-06-17 RX ORDER — LORAZEPAM 2 MG/ML
2 INJECTION INTRAMUSCULAR
Status: DISCONTINUED | OUTPATIENT
Start: 2021-06-17 | End: 2021-06-21 | Stop reason: HOSPADM

## 2021-06-17 RX ORDER — LORAZEPAM 2 MG/ML
1 INJECTION INTRAMUSCULAR
Status: DISCONTINUED | OUTPATIENT
Start: 2021-06-17 | End: 2021-06-21 | Stop reason: HOSPADM

## 2021-06-17 RX ORDER — CALCIUM CARBONATE 200(500)MG
2 TABLET,CHEWABLE ORAL 3 TIMES DAILY PRN
Status: DISCONTINUED | OUTPATIENT
Start: 2021-06-17 | End: 2021-06-21 | Stop reason: HOSPADM

## 2021-06-17 RX ORDER — LORAZEPAM 1 MG/1
1 TABLET ORAL
Status: DISCONTINUED | OUTPATIENT
Start: 2021-06-17 | End: 2021-06-21 | Stop reason: HOSPADM

## 2021-06-17 RX ADMIN — GUAIFENESIN 600 MG: 600 TABLET, EXTENDED RELEASE ORAL at 20:56

## 2021-06-17 RX ADMIN — Medication 250 MG: at 20:56

## 2021-06-17 RX ADMIN — TAZOBACTAM SODIUM AND PIPERACILLIN SODIUM 3.38 G: 375; 3 INJECTION, SOLUTION INTRAVENOUS at 06:08

## 2021-06-17 RX ADMIN — TAZOBACTAM SODIUM AND PIPERACILLIN SODIUM 3.38 G: 375; 3 INJECTION, SOLUTION INTRAVENOUS at 20:58

## 2021-06-17 RX ADMIN — HYDROCODONE POLISTIREX AND CHLORPHENIRAMINE POLISTIREX 2.5 ML: 10; 8 SUSPENSION, EXTENDED RELEASE ORAL at 20:56

## 2021-06-17 RX ADMIN — ANTACID TABLETS 2 TABLET: 500 TABLET, CHEWABLE ORAL at 22:03

## 2021-06-17 RX ADMIN — GUAIFENESIN 600 MG: 600 TABLET, EXTENDED RELEASE ORAL at 09:18

## 2021-06-17 RX ADMIN — LINEZOLID 600 MG: 600 TABLET, FILM COATED ORAL at 20:56

## 2021-06-17 RX ADMIN — Medication 250 MG: at 09:18

## 2021-06-17 RX ADMIN — SODIUM CHLORIDE, PRESERVATIVE FREE 10 ML: 5 INJECTION INTRAVENOUS at 20:57

## 2021-06-17 RX ADMIN — TAZOBACTAM SODIUM AND PIPERACILLIN SODIUM 3.38 G: 375; 3 INJECTION, SOLUTION INTRAVENOUS at 13:40

## 2021-06-17 RX ADMIN — LORAZEPAM 1 MG: 1 TABLET ORAL at 22:02

## 2021-06-17 RX ADMIN — Medication 1 CAPSULE: at 09:19

## 2021-06-17 RX ADMIN — LINEZOLID 600 MG: 600 TABLET, FILM COATED ORAL at 09:18

## 2021-06-17 RX ADMIN — HYDROCODONE POLISTIREX AND CHLORPHENIRAMINE POLISTIREX 2.5 ML: 10; 8 SUSPENSION, EXTENDED RELEASE ORAL at 06:10

## 2021-06-17 NOTE — PROGRESS NOTES
[facility] Medicine Services  PROGRESS NOTE    Patient Name: Shaun Tariq  : 1955  MRN: [id number]    Date of Admission: 6/15/2021  Primary Care Physician: Provider, No Known    Subjective   Subjective     CC:  Fever, cough    HPI:  States he is feeling better today. Cough improved with medication. Has provided 2 sputum samples. Fever/nightsweats are improved.     ROS:  Gen- No fevers, chills  CV- No chest pain, palpitations  Resp- + cough, dyspnea  GI- No N/V/D, abd pain    Objective   Objective     Vital Signs:   Temp:  [98.2 °F (36.8 °C)-99.3 °F (37.4 °C)] 98.7 °F (37.1 °C)  Heart Rate:  [67-88] 67  Resp:  [18-20] 20  BP: (131-146)/(80-94) 133/80        Physical Exam:  Constitutional: No acute distress, awake, alert; appears better today  HENT: NCAT, mucous membranes moist  Respiratory: Clear to auscultation bilaterally, respiratory effort normal   Cardiovascular: RRR, no murmurs, rubs, or gallops  Gastrointestinal: Positive bowel sounds, soft, nontender, nondistended  Musculoskeletal: No bilateral ankle edema  Psychiatric: Appropriate affect, cooperative  Neurologic: Oriented x 3, strength symmetric in all extremities, Cranial Nerves grossly intact to confrontation, speech clear  Skin: No rashes    Results Reviewed:  Results from last 7 days   Lab Units 21  0508 21  1023 21  0455 06/15/21  1512   WBC 10*3/mm3 9.12  --  10.59 12.18*   HEMOGLOBIN g/dL 11.4*  --  10.9* 13.0   HEMATOCRIT % 35.5*  --  35.1* 39.7   PLATELETS 10*3/mm3 484*  --  475* 490*   INR   --  1.11  --   --    PROCALCITONIN ng/mL  --   --   --  0.05     Results from last 7 days   Lab Units 21  0508 21  1426 21  0455 06/15/21  1512   SODIUM mmol/L 136 133* 131* 133*   POTASSIUM mmol/L 4.0  --  3.6 4.0   CHLORIDE mmol/L 101  --  98 97*   CO2 mmol/L 26.0  --  22.0 22.0   BUN mg/dL 9  --  14 15   CREATININE mg/dL 0.90  --  0.72* 0.88   GLUCOSE mg/dL 97  --  91 133*   CALCIUM  mg/dL 8.9  --  8.7 9.9   ALT (SGPT) U/L  --   --   --  20   AST (SGOT) U/L  --   --   --  31   TROPONIN T ng/mL  --   --   --  <0.010   PROBNP pg/mL  --   --   --  165.1     Estimated Creatinine Clearance: 91.9 mL/min (by C-G formula based on SCr of 0.9 mg/dL).    Microbiology Results Abnormal     Procedure Component Value - Date/Time    MRSA Screen, PCR (Inpatient) - Swab, Nares [[id number]]  (Normal) Collected: 06/16/21 1810    Lab Status: Final result Specimen: Swab from Nares Updated: 06/16/21 2201     MRSA PCR Negative    Narrative:      MRSA Negative    Blood Culture - Blood, Arm, Left [[id number]] Collected: 06/15/21 1800    Lab Status: Preliminary result Specimen: Blood from Arm, Left Updated: 06/16/21 1832     Blood Culture No growth at 24 hours    Blood Culture - Blood, Arm, Right [[id number]] Collected: 06/15/21 1740    Lab Status: Preliminary result Specimen: Blood from Arm, Right Updated: 06/16/21 1832     Blood Culture No growth at 24 hours    S. Pneumo Ag Urine or CSF - Urine, Urine, Clean Catch [[id number]]  (Normal) Collected: 06/16/21 0955    Lab Status: Final result Specimen: Urine, Clean Catch Updated: 06/16/21 1458     Strep Pneumo Ag Negative    Legionella Antigen, Urine - Urine, Urine, Clean Catch [[id number]]  (Normal) Collected: 06/16/21 0955    Lab Status: Final result Specimen: Urine, Clean Catch Updated: 06/16/21 1458     LEGIONELLA ANTIGEN, URINE Negative    Respiratory Culture - Sputum, Cough [[id number]] Collected: 06/16/21 1132    Lab Status: Preliminary result Specimen: Sputum from Cough Updated: 06/16/21 1444     Gram Stain Many (4+) WBCs per low power field      Few (2+) Epithelial cells per low power field      Few (2+) Gram positive cocci in pairs and clusters      Rare (1+) Gram negative bacilli    COVID-19 and FLU A/B PCR - Swab, Nasopharynx [[id number]]  (Normal) Collected: 06/15/21 1722    Lab Status: Final result Specimen: Swab from Nasopharynx Updated: 06/15/21 1801     COVID19 Not  Detected     Influenza A PCR Not Detected     Influenza B PCR Not Detected    Narrative:      Fact sheet for providers: https://www.fda.gov/media/753651/download    Fact sheet for patients: https://www.fda.gov/media/138101/download    Test performed by PCR.          Imaging Results (Last 24 Hours)     ** No results found for the last 24 hours. **              I have reviewed the medications:  Scheduled Meds:azithromycin, 500 mg, Intravenous, Once  guaiFENesin, 600 mg, Oral, Q12H  lactobacillus acidophilus, 1 capsule, Oral, Daily  linezolid, 600 mg, Oral, Q12H  piperacillin-tazobactam, 3.375 g, Intravenous, Q8H  saccharomyces boulardii, 250 mg, Oral, BID  sodium chloride, 10 mL, Intravenous, Q12H      Continuous Infusions:   PRN Meds:.•  acetaminophen  •  HYDROcod Polst-CPM Polst ER  •  ondansetron **OR** ondansetron  •  sodium chloride  •  sodium chloride    Assessment/Plan   Assessment & Plan     Active Hospital Problems    Diagnosis  POA   • **Pneumonia of right lower lobe due to infectious organism [J18.9]  Yes   • Sepsis (CMS/HCC) [A41.9]  Unknown   • Hyponatremia [E87.1]  Unknown   • Night sweats [R61]  Unknown      Resolved Hospital Problems   No resolved problems to display.        Brief Hospital Course to date:  Shaun Tariq is a 65 y.o. male with no past medical history on no medications who presented with progressive SOA/cough x 4 days with night sweats for several months.      Sepsis - fever, WBC, source (POA)  PNA/cavitary lung lesion   Night sweats   Fever   - CTA with 6 cm cavitary lesion in lateral right lung   - likely infectious with other symptoms but neoplasm can't be ruled out   - ID consulted   - BCx NGTD; strep/legionella negative, MRSA negative; fungal workup pending, AFB x 1 collected and pending   - TB quantiferon pending   - continue airborne isolation   - Once TB ruled out will need CT guided biopsy   - Continue zyvox and zosyn   - PRN cough medication      Mild hyponatremia   - Likely  related to volume depletion -- improved with fluids     ETOH use  - 5-7 beers per night   - CIWA protocol      Thrombocytosis  - Likely reactive; monitor      DVT prophylaxis:  Mechanical DVT prophylaxis orders are present.         Disposition: I expect the patient to be discharged TBD.      CODE STATUS:   Code Status and Medical Interventions:   Ordered at: 06/15/21 2014     Code Status:    CPR     Medical Interventions (Level of Support Prior to Arrest):    Full       Rosibel Renee,   06/17/21

## 2021-06-17 NOTE — PROGRESS NOTES
Infectious Disease Progress note  Shaun Tariq  1955  [id number]    Date of Consult: 6/16/2021  Admission Date: 6/15/2021    Requesting Provider: Dr Campbell  Evaluating Physician: Myles Musa MD    Chief Complaint: cough, dyspnea    Reason for Consultation: cavitary lung lesion    History of present illness:   Patient is a 65 y.o.  Yr old male with history of ongoing alcohol abuse (5-7 beers daily), and 30 pack year smoking history.  He presented to urgent care complaining of 4 days progress cough, sputum production and fever to 102. He complains of drenching night sweats for 3 months.  No weight loss admitted and started on meropenem. Febrile to 101.1 here. Not hypoxic on room air. COVID and flu negative. WBC 12. Normal lactate, procal.  Patient denies any known exposures to tuberculosis.  No history of international travel,  service, homelessness or incarceration.  He does have a pet dog but is not around any other animals including birds.  He has lived in [place] nearly his whole life.  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  He complains of poor appetite, nausea and emesis    6/17/21: Feels better today with less dyspnea.  Cough became productive with hypertonic saline and he has produced a least 2 sputum specimens per patient.  Currently not hypoxic off oxygen and afebrile overnight.  Less chest discomfort    Review of Systems  See above. Otherwise No n/v/d. No rash. No new ADR to Abx.       No Known Allergies    Antibiotics:  Anti-Infectives (From admission, onward)    Ordered     Dose/Rate Route Frequency Start Stop    06/16/21 0945  piperacillin-tazobactam (ZOSYN) 3.375 g in iso-osmotic dextrose 50 ml (premix)     Ordering Provider: Myles Musa MD    3.375 g  over 4 Hours Intravenous Every 8 Hours 06/16/21 1400 06/26/21 1359    06/16/21 0945  linezolid (ZYVOX) tablet 600 mg     Ordering Provider: Myles Musa MD    600 mg Oral Every 12  Hours Scheduled 06/16/21 1045 06/26/21 0859    06/15/21 2252  meropenem (MERREM) 1 g/100 mL 0.9% NS VTB (mbp)     Ordering Provider: Torrey Campbell MD    1 g  over 30 Minutes Intravenous Once 06/15/21 2345 06/16/21 0016    06/15/21 1932  cefTRIAXone (ROCEPHIN) 1 g/100 mL 0.9% NS (MBP)     Ordering Provider: Sharon Garcia APRN    1 g  over 30 Minutes Intravenous Once 06/15/21 1934 06/15/21 2205    06/15/21 1932  AZITHROMYCIN 500 MG/250 ML 0.9% NS IVPB (vial-mate)     Ordering Provider: Torrey Campbell MD    500 mg  over 60 Minutes Intravenous Once 06/15/21 1933            Other Medications:  Current Facility-Administered Medications   Medication Dose Route Frequency Provider Last Rate Last Admin   • acetaminophen (TYLENOL) tablet 650 mg  650 mg Oral Q6H PRN Torrey Campbell MD   650 mg at 06/16/21 0554   • AZITHROMYCIN 500 MG/250 ML 0.9% NS IVPB (vial-mate)  500 mg Intravenous Once Torrey Campbell MD       • guaiFENesin (MUCINEX) 12 hr tablet 600 mg  600 mg Oral Q12H Torrey Campbell MD   600 mg at 06/17/21 0918   • HYDROcod Polst-CPM Polst ER (TUSSIONEX PENNKINETIC) 10-8 MG/5ML ER suspension 2.5 mL  2.5 mL Oral Q12H PRN Rosibel Renee DO   2.5 mL at 06/17/21 0610   • lactobacillus acidophilus (RISAQUAD) capsule 1 capsule  1 capsule Oral Daily Myles Musa MD   1 capsule at 06/17/21 0919   • linezolid (ZYVOX) tablet 600 mg  600 mg Oral Q12H Myles Musa MD   600 mg at 06/17/21 0918   • LORazepam (ATIVAN) tablet 1 mg  1 mg Oral Q2H PRN Rosibel Renee DO        Or   • LORazepam (ATIVAN) injection 1 mg  1 mg Intravenous Q2H PRN Rosibel Renee DO        Or   • LORazepam (ATIVAN) tablet 2 mg  2 mg Oral Q1H PRN Rosibel Renee,         Or   • LORazepam (ATIVAN) injection 2 mg  2 mg Intravenous Q1H PRN Rosibel Renee DO        Or   • LORazepam (ATIVAN) injection 2 mg  2 mg Intravenous Q15 Min PRN Rosibel Renee DO        Or   • LORazepam (ATIVAN) injection 2 mg  2 mg Intramuscular Q15  "Min PRN Rosibel Renee, DO       • ondansetron (ZOFRAN) tablet 4 mg  4 mg Oral Q6H PRN Torrey aCmpbell MD        Or   • ondansetron (ZOFRAN) injection 4 mg  4 mg Intravenous Q6H PRN Torrey Campbell MD   4 mg at 06/16/21 1304   • piperacillin-tazobactam (ZOSYN) 3.375 g in iso-osmotic dextrose 50 ml (premix)  3.375 g Intravenous Q8H Myles Musa MD   3.375 g at 06/17/21 0608   • saccharomyces boulardii (FLORASTOR) capsule 250 mg  250 mg Oral BID Torrey Campbell MD   250 mg at 06/17/21 0918   • sodium chloride 0.9 % flush 10 mL  10 mL Intravenous PRN Torrey Campbell MD       • sodium chloride 0.9 % flush 10 mL  10 mL Intravenous Q12H Torrey Campbell MD   10 mL at 06/16/21 0907   • sodium chloride 0.9 % flush 10 mL  10 mL Intravenous PRN Torrey Campbell MD           Physical Exam:   Vital Signs   /85 (BP Location: Right arm, Patient Position: Lying)   Pulse 94   Temp 98.4 °F (36.9 °C) (Oral)   Resp 18   Ht 172.7 cm (68\")   Wt 90.7 kg (200 lb)   SpO2 92%   BMI 30.41 kg/m²     GENERAL: Awake and alert, reasonably comfortable appearing  HEENT: Normocephalic, atraumatic.  EOMI. No conjunctival injection. No icterus. Oropharynx clear without evidence of thrush or exudate.  Fair dentition  NECK: Supple without nuchal rigidity. No mass.  LYMPH: No cervical, axillary lymphadenopathy.  HEART: RRR; No murmur.  LUNGS: Diminished at right base.  No cough noted today   ABDOMEN: Soft, nontender, nondistended. Obese. No rebound or guarding.  EXT:  No cyanosis, clubbing or edema.  : Without Yanes catheter.  MSK: FROM without joint effusions noted arms/legs.    SKIN: Warm and dry without cutaneous eruptions on Inspection/palpation.    NEURO: Oriented to PPT. No focal deficits on motor/sensory exam at arms/legs.  PSYCHIATRIC: Normal insight and judgement. Cooperative with PE    Laboratory Data    Results from last 7 days   Lab Units 06/17/21  0508 06/16/21  0455 06/15/21  1512   WBC 10*3/mm3 9.12 10.59 " 12.18*   HEMOGLOBIN g/dL 11.4* 10.9* 13.0   HEMATOCRIT % 35.5* 35.1* 39.7   PLATELETS 10*3/mm3 484* 475* 490*     Results from last 7 days   Lab Units 06/17/21  0508   SODIUM mmol/L 136   POTASSIUM mmol/L 4.0   CHLORIDE mmol/L 101   CO2 mmol/L 26.0   BUN mg/dL 9   CREATININE mg/dL 0.90   GLUCOSE mg/dL 97   CALCIUM mg/dL 8.9     Estimated Creatinine Clearance: 91.9 mL/min (by C-G formula based on SCr of 0.9 mg/dL).  Results from last 7 days   Lab Units 06/15/21  1512   ALK PHOS U/L 81   BILIRUBIN mg/dL 0.3   ALT (SGPT) U/L 20   AST (SGOT) U/L 31               Microbiology:  COVID and flu negative  Blood cx pending, so far negative  Urine strep and legionella antigens negative  Respiratory culture with normal trudi only so far   MRSA nares screening negative  AFB cultures pending     Radiology:  XR Chest 2 View    Result Date: 6/15/2021  Findings concerning for cavitating pneumonia in the right midlung.  This report was finalized on 6/15/2021 2:04 PM by Linden Real.      CT Angiogram Chest    Result Date: 6/15/2021  Impression: 1. No evidence of pulmonary embolus on this study. 2. 6 cm cavitary lesion in the lateral aspect of the right lower lobe. The differential diagnosis would be a cavitating pneumonia/abscess versus a cavitating neoplasm. Signer Name: Thiago Quinn MD  Signed: 6/15/2021 7:17 PM  Workstation Name: [id number]  [facility] personally reviewed the above CT images. Large thick walled RLL cavitary lesion. No other focal infiltrate      Impression:   1. Right lower lobe cavitary lesion: 6 cm on CT. Thick walled.  Differential includes bacterial pneumonia, fungal pneumonia, tuberculosis, malignancy.  Could be malignancy with secondary infection. less likely vasculitis with his age but not impossible.  No specific risk factors for tuberculosis identified but with his history of several months of night sweats and his imaging findings we certainly need to rule it out.   Improving over 24 hours with antibacterial coverage alone makes bacterial etiology most likely.  2. Fever, sepsis, leukocytosis   3. Night sweats  4. Former smoker  5. Ongoing alcohol abuse    PLAN:   - Airborne isolation for possible tuberculosis until adequately screened with 3 negative AFB smears or 1 BAL specimen.   - 3 AFB sputum smears  by at least 8 hours.  Respiratory therapy consulted for induced sputum.  If unable to produce adequate specimen even with hypertonic saline will need pulmonary consultation to consider bronchoscopy. 1 smear pending in lab. Discussed with RN  - TB QuantiFERON pending  - Bacterial and fungal sputum cultures pending  - Fungal work-up including aspergillosis, histoplasmosis and blastomycosis antigens and antibodies pending  - Follow-up pending blood cultures  - Follow CBC, CMP    - Once tuberculosis has been adequately screened for will need CT-guided biopsy to obtain tissue cultures and specimen for pathology to screen for malignancy    - Linezolid 600 mg twice daily until MRSA ruled out  - Continue Zosyn  - probiotic     Highly complex MDM.  Discussed with patient and wife at bedside today.  I will follow    Myles Musa MD  6/17/2021

## 2021-06-17 NOTE — CASE MANAGEMENT/SOCIAL WORK
Discharge Planning Assessment  [facility]     Patient Name: Shaun Tariq  MRN: [id number]  Today's Date: 6/17/2021    Admit Date: 6/15/2021    Discharge Needs Assessment     Row Name 06/17/21 1104       Living Environment    Lives With  spouse    Current Living Arrangements  home/apartment/condo    Primary Care Provided by  self    Provides Primary Care For  no one    Family Caregiver if Needed  spouse    Quality of Family Relationships  supportive;involved;helpful    Able to Return to Prior Arrangements  yes       Resource/Environmental Concerns    Transportation Concerns  car, none       Transition Planning    Patient/Family Anticipates Transition to  home with family    Patient/Family Anticipated Services at Transition      Transportation Anticipated  family or friend will provide       Discharge Needs Assessment    Readmission Within the Last 30 Days  no previous admission in last 30 days    Equipment Currently Used at Home  none    Concerns to be Addressed  discharge planning;financial/insurance    Anticipated Changes Related to Illness  none    Equipment Needed After Discharge  none    Current Discharge Risk  chronically ill        Discharge Plan     Row Name 06/17/21 1105       Plan    Plan  HOME    Patient/Family in Agreement with Plan  yes    Plan Comments  Met with pt and wife at bedside.  They reside in [facility].  He is independent with ADLs.  No current DME or HH services.  Pt advised he has no current insurance or Rx coverage.  CM left  for [employer] to screen for Medicaid eligibility.  Goal is to return home upon DC.  CM will cont to follow for any needs.    Final Discharge Disposition Code  01 - home or self-care        Continued Care and Services - Admitted Since 6/15/2021    Coordination has not been started for this encounter.         Demographic Summary     Row Name 06/17/21 1104       General Information    Admission Type  inpatient    Referral Source  admission list    Reason  for Consult  discharge planning    Preferred Language  English       Contact Information    Permission Granted to Share Info With      Contact Information Obtained for          Functional Status     Row Name 06/17/21 1104       Functional Status    Usual Activity Tolerance  good       Functional Status, IADL    Medications  independent    Meal Preparation  independent    Housekeeping  independent    Laundry  independent    Shopping  independent        Psychosocial    No documentation.       Abuse/Neglect    No documentation.       Legal    No documentation.       Substance Abuse    No documentation.       Patient Forms    No documentation.           Francesca Will RN

## 2021-06-18 LAB
BACTERIA SPEC RESP CULT: NORMAL
DEPRECATED RDW RBC AUTO: 51.8 FL (ref 37–54)
ERYTHROCYTE [DISTWIDTH] IN BLOOD BY AUTOMATED COUNT: 14.2 % (ref 12.3–15.4)
GRAM STN SPEC: NORMAL
H CAPSUL AG UR QL IA: <0.5
HCT VFR BLD AUTO: 34.1 % (ref 37.5–51)
HGB BLD-MCNC: 10.7 G/DL (ref 13–17.7)
Lab: NORMAL
MCH RBC QN AUTO: 30.7 PG (ref 26.6–33)
MCHC RBC AUTO-ENTMCNC: 31.4 G/DL (ref 31.5–35.7)
MCV RBC AUTO: 97.7 FL (ref 79–97)
PLATELET # BLD AUTO: 490 10*3/MM3 (ref 140–450)
PMV BLD AUTO: 9.6 FL (ref 6–12)
RBC # BLD AUTO: 3.49 10*6/MM3 (ref 4.14–5.8)
WBC # BLD AUTO: 6.18 10*3/MM3 (ref 3.4–10.8)

## 2021-06-18 PROCEDURE — 85027 COMPLETE CBC AUTOMATED: CPT | Performed by: INTERNAL MEDICINE

## 2021-06-18 PROCEDURE — 99232 SBSQ HOSP IP/OBS MODERATE 35: CPT | Performed by: INTERNAL MEDICINE

## 2021-06-18 PROCEDURE — 25010000002 PIPERACILLIN SOD-TAZOBACTAM PER 1 G: Performed by: INTERNAL MEDICINE

## 2021-06-18 RX ADMIN — GUAIFENESIN 600 MG: 600 TABLET, EXTENDED RELEASE ORAL at 09:10

## 2021-06-18 RX ADMIN — Medication 250 MG: at 09:10

## 2021-06-18 RX ADMIN — Medication 1 CAPSULE: at 09:10

## 2021-06-18 RX ADMIN — GUAIFENESIN 600 MG: 600 TABLET, EXTENDED RELEASE ORAL at 20:48

## 2021-06-18 RX ADMIN — TAZOBACTAM SODIUM AND PIPERACILLIN SODIUM 3.38 G: 375; 3 INJECTION, SOLUTION INTRAVENOUS at 20:48

## 2021-06-18 RX ADMIN — TAZOBACTAM SODIUM AND PIPERACILLIN SODIUM 3.38 G: 375; 3 INJECTION, SOLUTION INTRAVENOUS at 15:10

## 2021-06-18 RX ADMIN — Medication 250 MG: at 20:48

## 2021-06-18 RX ADMIN — TAZOBACTAM SODIUM AND PIPERACILLIN SODIUM 3.38 G: 375; 3 INJECTION, SOLUTION INTRAVENOUS at 05:36

## 2021-06-18 RX ADMIN — LINEZOLID 600 MG: 600 TABLET, FILM COATED ORAL at 09:10

## 2021-06-18 RX ADMIN — SODIUM CHLORIDE, PRESERVATIVE FREE 10 ML: 5 INJECTION INTRAVENOUS at 20:48

## 2021-06-18 NOTE — CASE MANAGEMENT/SOCIAL WORK
Continued Stay Note  [facility]     Patient Name: Shaun Tariq  MRN: [id number]  Today's Date: 6/18/2021    Admit Date: 6/15/2021    Discharge Plan     Row Name 06/18/21 0926       Plan    Plan Comments  Pt will need CT guided lung biopsy once TB has been ruled out.  AFB negative x 1.  Awaiting results of final 2 AFB.  Cont IV Zosyn and IV Zyvox.  Tolerating RA.  CM will cont to follow for any DC needs.  Of note, CM left [facility] for [facility] to screen for Medicaid eligibility.      Final Discharge Disposition Code  01 - home or self-care        Discharge Codes    No documentation.             Francesca Will RN

## 2021-06-18 NOTE — PROGRESS NOTES
Infectious Disease Progress note  Shaun Tariq  1955  [id number]    Date of Consult: 6/16/2021  Admission Date: 6/15/2021    Requesting Provider: Dr Campbell  Evaluating Physician: Myles Musa MD    Chief Complaint: cough, dyspnea    Reason for Consultation: cavitary lung lesion    History of present illness:   Patient is a 65 y.o.  Yr old male with history of ongoing alcohol abuse (5-7 beers daily), and 30 pack year smoking history.  He presented to urgent care complaining of 4 days progress cough, sputum production and fever to 102. He complains of drenching night sweats for 3 months.  No weight loss admitted and started on meropenem. Febrile to 101.1 here. Not hypoxic on room air. COVID and flu negative. WBC 12. Normal lactate, procal.  Patient denies any known exposures to tuberculosis.  No history of international travel,  service, homelessness or incarceration.  He does have a pet dog but is not around any other animals including birds.  He has lived in [place] nearly his whole life.  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  He complains of poor appetite, nausea and emesis    6/17/21: Feels better today with less dyspnea.  Cough became productive with hypertonic saline and he has produced a least 2 sputum specimens per patient.  Currently not hypoxic off oxygen and afebrile overnight.  Less chest discomfort    6/18/21: Feels better again today.  Off oxygen.  Afebrile.  Less dyspnea, cough.  Tolerating current IV antibiotics.  Patient says he is currently uninsured, but working to get Medicaid coverage    Review of Systems  See above. Otherwise No n/v/d. No rash. No new ADR to Abx.       No Known Allergies    Antibiotics:  Anti-Infectives (From admission, onward)    Ordered     Dose/Rate Route Frequency Start Stop    06/16/21 0954  piperacillin-tazobactam (ZOSYN) 3.375 g in iso-osmotic dextrose 50 ml (premix)     Ordering Provider: Myles Musa MD     3.375 g  over 4 Hours Intravenous Every 8 Hours 06/16/21 1400 06/26/21 1359    06/16/21 0945  linezolid (ZYVOX) tablet 600 mg     Ordering Provider: Myles Musa MD    600 mg Oral Every 12 Hours Scheduled 06/16/21 1045 06/26/21 0859    06/15/21 2252  meropenem (MERREM) 1 g/100 mL 0.9% NS VTB (mbp)     Ordering Provider: Torrey Campbell MD    1 g  over 30 Minutes Intravenous Once 06/15/21 2345 06/16/21 0016    06/15/21 1932  cefTRIAXone (ROCEPHIN) 1 g/100 mL 0.9% NS (MBP)     Ordering Provider: Sharon Garcia APRN    1 g  over 30 Minutes Intravenous Once 06/15/21 1934 06/15/21 2205          Other Medications:  Current Facility-Administered Medications   Medication Dose Route Frequency Provider Last Rate Last Admin   • acetaminophen (TYLENOL) tablet 650 mg  650 mg Oral Q6H PRN Torrey Campbell MD   650 mg at 06/16/21 0554   • calcium carbonate (TUMS) chewable tablet 500 mg (200 mg elemental)  2 tablet Oral TID PRN Nickie Johnson PA-C   2 tablet at 06/17/21 2203   • guaiFENesin (MUCINEX) 12 hr tablet 600 mg  600 mg Oral Q12H Torrey Campbell MD   600 mg at 06/18/21 0910   • HYDROcod Polst-CPM Polst ER (TUSSIONEX PENNKINETIC) 10-8 MG/5ML ER suspension 2.5 mL  2.5 mL Oral Q12H PRN Rosibel Renee DO   2.5 mL at 06/17/21 2056   • lactobacillus acidophilus (RISAQUAD) capsule 1 capsule  1 capsule Oral Daily Myles Musa MD   1 capsule at 06/18/21 0910   • linezolid (ZYVOX) tablet 600 mg  600 mg Oral Q12H Myles Musa MD   600 mg at 06/18/21 0910   • LORazepam (ATIVAN) tablet 1 mg  1 mg Oral Q2H PRN Rosibel Renee DO   1 mg at 06/17/21 2202    Or   • LORazepam (ATIVAN) injection 1 mg  1 mg Intravenous Q2H PRN Rosibel Renee, DO        Or   • LORazepam (ATIVAN) tablet 2 mg  2 mg Oral Q1H PRN Rosibel Renee, DO        Or   • LORazepam (ATIVAN) injection 2 mg  2 mg Intravenous Q1H PRN Rosibel Renee, DO        Or   • LORazepam (ATIVAN) injection 2 mg  2 mg Intravenous Q15 Min PRN  "Rosibel Renee DO        Or   • LORazepam (ATIVAN) injection 2 mg  2 mg Intramuscular Q15 Min PRN Rosibel Renee DO       • ondansetron (ZOFRAN) tablet 4 mg  4 mg Oral Q6H PRN Torrey Campbell MD        Or   • ondansetron (ZOFRAN) injection 4 mg  4 mg Intravenous Q6H PRN Torrey Campbell MD   4 mg at 06/16/21 1304   • piperacillin-tazobactam (ZOSYN) 3.375 g in iso-osmotic dextrose 50 ml (premix)  3.375 g Intravenous Q8H Myles Musa MD   3.375 g at 06/18/21 0536   • saccharomyces boulardii (FLORASTOR) capsule 250 mg  250 mg Oral BID Torrey Campbell MD   250 mg at 06/18/21 0910   • sodium chloride 0.9 % flush 10 mL  10 mL Intravenous PRN Torrey Campbell MD       • sodium chloride 0.9 % flush 10 mL  10 mL Intravenous Q12H Torrey Campbell MD   10 mL at 06/17/21 2057   • sodium chloride 0.9 % flush 10 mL  10 mL Intravenous PRN Torrey Campbell MD       • sodium chloride 7 % nebulizer solution nebulizer solution 4 mL  4 mL Nebulization Q8H - RT Myles Musa MD           Physical Exam:   Vital Signs   /70 (BP Location: Left arm, Patient Position: Lying)   Pulse 72   Temp 98.3 °F (36.8 °C) (Oral)   Resp 18   Ht 172.7 cm (68\")   Wt 90.7 kg (200 lb)   SpO2 91%   BMI 30.41 kg/m²     GENERAL: Awake and alert, reasonably comfortable appearing  HEENT: Normocephalic, atraumatic.  EOMI. No conjunctival injection. No icterus. Oropharynx clear without evidence of thrush or exudate.  Fair dentition  NECK: Supple without nuchal rigidity. No mass.  HEART: RRR; No murmur.  LUNGS: Diminished at right base.  No cough noted today   ABDOMEN: Soft, nontender, nondistended. Obese. No rebound or guarding.  EXT:  No cyanosis, clubbing or edema.  : Without Yanes catheter.  MSK: FROM without joint effusions noted arms/legs.    SKIN: Warm and dry without cutaneous eruptions on Inspection/palpation.    NEURO: Oriented to PPT. No focal deficits on motor/sensory exam at arms/legs.  PSYCHIATRIC: Normal insight and " judgement. Cooperative with [facility]    Laboratory Data    Results from last 7 days   Lab Units 06/18/21  0412 06/17/21  0508 06/16/21  0455   WBC 10*3/mm3 6.18 9.12 10.59   HEMOGLOBIN g/dL 10.7* 11.4* 10.9*   HEMATOCRIT % 34.1* 35.5* 35.1*   PLATELETS 10*3/mm3 490* 484* 475*     Results from last 7 days   Lab Units 06/17/21  0508   SODIUM mmol/L 136   POTASSIUM mmol/L 4.0   CHLORIDE mmol/L 101   CO2 mmol/L 26.0   BUN mg/dL 9   CREATININE mg/dL 0.90   GLUCOSE mg/dL 97   CALCIUM mg/dL 8.9     Estimated Creatinine Clearance: 91.9 mL/min (by C-G formula based on SCr of 0.9 mg/dL).  Results from last 7 days   Lab Units 06/15/21  1512   ALK PHOS U/L 81   BILIRUBIN mg/dL 0.3   ALT (SGPT) U/L 20   AST (SGOT) U/L 31               Microbiology:  COVID and flu negative  Blood cx pending, so far negative  Urine strep and legionella antigens negative  Respiratory culture with normal trudi only so far   MRSA nares screening negative  AFB cultures pending     Radiology:  XR Chest 2 View    Result Date: 6/15/2021  Findings concerning for cavitating pneumonia in the right midlung.  This report was finalized on 6/15/2021 2:04 PM by Linden Real.      CT Angiogram Chest    Result Date: 6/15/2021  Impression: 1. No evidence of pulmonary embolus on this study. 2. 6 cm cavitary lesion in the lateral aspect of the right lower lobe. The differential diagnosis would be a cavitating pneumonia/abscess versus a cavitating neoplasm. Signer Name: Thiago Quinn MD  Signed: 6/15/2021 7:17 PM  Workstation Name: [id number]  [facility] personally reviewed the above CT images. Large thick walled RLL cavitary lesion. No other focal infiltrate      Impression:   1. Right lower lobe cavitary lesion: 6 cm on CT. Thick walled.  Differential includes bacterial pneumonia, fungal pneumonia, tuberculosis, malignancy.  Could be malignancy with secondary infection. less likely vasculitis with his age but not impossible.  No  specific risk factors for tuberculosis identified but with his history of several months of night sweats and his imaging findings we certainly need to rule it out.  Improving over 24 hours with antibacterial coverage alone makes bacterial etiology most likely.  2. Fever, sepsis, leukocytosis   3. Night sweats  4. Former smoker  5. Ongoing alcohol abuse    PLAN:   - Airborne isolation for possible tuberculosis until adequately screened with 3 negative AFB smears or 1 BAL specimen.  I discussed with the lab this afternoon as well as nursing staff.  Final 2 AFB smear should be read by early afternoon.  If both of these are negative okay to discontinue airborne isolation    - TB QuantiFERON pending  - Bacterial and fungal sputum cultures pending  - Fungal work-up including aspergillosis, histoplasmosis and blastomycosis antigens and antibodies pending  - Follow-up pending blood cultures  - Follow CBC, CMP    - Once airborne isolation is discontinued he will need need CT-guided biopsy to obtain tissue cultures (bacterial, anaerobic, fungal, AFB) and specimen for pathology to screen for malignancy.  Orders placed for cultures    - stop linezolid  - Continue empiric Zosyn for now  - probiotic     Highly complex MDM.  Discussed with patient and wife at bedside today. Also with RN and micro lab.  I will follow over the weekend    Myles Musa MD  6/18/2021

## 2021-06-18 NOTE — PROGRESS NOTES
[facility] Medicine Services  PROGRESS NOTE    Patient Name: Shaun Tariq  : 1955  MRN: [id number]    Date of Admission: 6/15/2021  Primary Care Physician: Cabrera Temple MD    Subjective   Subjective     CC:  Fever, cough     HPI:  States that he is feeling better. Cough and fever are improved. No night sweats. Reviewed pending workup.     ROS:  Gen- No fevers, chills  CV- No chest pain, palpitations  Resp- No cough, dyspnea  GI- No N/V/D, abd pain     Objective   Objective     Vital Signs:   Temp:  [98.3 °F (36.8 °C)-98.5 °F (36.9 °C)] 98.3 °F (36.8 °C)  Heart Rate:  [62-94] 62  Resp:  [18-20] 18  BP: (127-141)/(70-88) 131/70        Physical Exam:  Constitutional: No acute distress, awake, alert  HENT: NCAT, mucous membranes moist  Respiratory: Clear to auscultation bilaterally, respiratory effort normal   Cardiovascular: RRR, no murmurs, rubs, or gallops  Gastrointestinal: Positive bowel sounds, soft, nontender, nondistended  Musculoskeletal: No bilateral ankle edema  Psychiatric: Appropriate affect, cooperative  Neurologic: Oriented x 3, strength symmetric in all extremities, Cranial Nerves grossly intact to confrontation, speech clear  Skin: No rashes    Results Reviewed:  Results from last 7 days   Lab Units 21  0412 21  0508 21  1023 21  0455 06/15/21  1512   WBC 10*3/mm3 6.18 9.12  --  10.59 12.18*   HEMOGLOBIN g/dL 10.7* 11.4*  --  10.9* 13.0   HEMATOCRIT % 34.1* 35.5*  --  35.1* 39.7   PLATELETS 10*3/mm3 490* 484*  --  475* 490*   INR   --   --  1.11  --   --    PROCALCITONIN ng/mL  --   --   --   --  0.05     Results from last 7 days   Lab Units 21  0508 21  1426 21  0455 06/15/21  1512   SODIUM mmol/L 136 133* 131* 133*   POTASSIUM mmol/L 4.0  --  3.6 4.0   CHLORIDE mmol/L 101  --  98 97*   CO2 mmol/L 26.0  --  22.0 22.0   BUN mg/dL 9  --  14 15   CREATININE mg/dL 0.90  --  0.72* 0.88   GLUCOSE mg/dL 97  --  91 133*    CALCIUM mg/dL 8.9  --  8.7 9.9   ALT (SGPT) U/L  --   --   --  20   AST (SGOT) U/L  --   --   --  31   TROPONIN T ng/mL  --   --   --  <0.010   PROBNP pg/mL  --   --   --  165.1     Estimated Creatinine Clearance: 91.9 mL/min (by C-G formula based on SCr of 0.9 mg/dL).    Microbiology Results Abnormal     Procedure Component Value - Date/Time    Blood Culture - Blood, Arm, Left [[id number]] Collected: 06/15/21 1800    Lab Status: Preliminary result Specimen: Blood from Arm, Left Updated: 06/17/21 1830     Blood Culture No growth at 2 days    Blood Culture - Blood, Arm, Right [[id number]] Collected: 06/15/21 1740    Lab Status: Preliminary result Specimen: Blood from Arm, Right Updated: 06/17/21 1830     Blood Culture No growth at 2 days    AFB Culture - Sputum, Cough [[id number]] Collected: 06/16/21 1132    Lab Status: Preliminary result Specimen: Sputum from Cough Updated: 06/17/21 1347     AFB Stain No acid fast bacilli seen on concentrated smear    Respiratory Culture - Sputum, Cough [[id number]] Collected: 06/16/21 1132    Lab Status: Preliminary result Specimen: Sputum from Cough Updated: 06/17/21 1119     Respiratory Culture Scant growth (1+) Normal Respiratory Antonieta: NO S.aureus/MRSA or Pseudomonas aeruginosa     Gram Stain Many (4+) WBCs per low power field      Few (2+) Epithelial cells per low power field      Few (2+) Gram positive cocci in pairs and clusters      Rare (1+) Gram negative bacilli    MRSA Screen, PCR (Inpatient) - Swab, Nares [[id number]]  (Normal) Collected: 06/16/21 1810    Lab Status: Final result Specimen: Swab from Nares Updated: 06/16/21 2201     MRSA PCR Negative    Narrative:      MRSA Negative    S. Pneumo Ag Urine or CSF - Urine, Urine, Clean Catch [[id number]]  (Normal) Collected: 06/16/21 0955    Lab Status: Final result Specimen: Urine, Clean Catch Updated: 06/16/21 1458     Strep Pneumo Ag Negative    Legionella Antigen, Urine - Urine, Urine, Clean Catch [[id number]]  (Normal)  Collected: 06/16/21 0955    Lab Status: Final result Specimen: Urine, Clean Catch Updated: 06/16/21 1458     LEGIONELLA ANTIGEN, URINE Negative    COVID-19 and FLU A/B PCR - Swab, Nasopharynx [[id number]]  (Normal) Collected: 06/15/21 1722    Lab Status: Final result Specimen: Swab from Nasopharynx Updated: 06/15/21 1801     COVID19 Not Detected     Influenza A PCR Not Detected     Influenza B PCR Not Detected    Narrative:      Fact sheet for providers: https://www.fda.gov/media/351179/download    Fact sheet for patients: https://www.fda.gov/media/873845/download    Test performed by PCR.          Imaging Results (Last 24 Hours)     ** No results found for the last 24 hours. **              I have reviewed the medications:  Scheduled Meds:guaiFENesin, 600 mg, Oral, Q12H  lactobacillus acidophilus, 1 capsule, Oral, Daily  linezolid, 600 mg, Oral, Q12H  piperacillin-tazobactam, 3.375 g, Intravenous, Q8H  saccharomyces boulardii, 250 mg, Oral, BID  sodium chloride, 10 mL, Intravenous, Q12H  sodium chloride, 4 mL, Nebulization, Q8H - RT      Continuous Infusions:   PRN Meds:.•  acetaminophen  •  calcium carbonate  •  HYDROcod Polst-CPM Polst ER  •  LORazepam **OR** LORazepam **OR** LORazepam **OR** LORazepam **OR** LORazepam **OR** LORazepam  •  ondansetron **OR** ondansetron  •  sodium chloride  •  sodium chloride    Assessment/Plan   Assessment & Plan     Active Hospital Problems    Diagnosis  POA   • **Pneumonia of right lower lobe due to infectious organism [J18.9]  Yes   • Thrombocytosis (CMS/HCC) [D47.3]  Unknown   • Sepsis (CMS/HCC) [A41.9]  Unknown   • Hyponatremia [E87.1]  Unknown   • Night sweats [R61]  Unknown      Resolved Hospital Problems   No resolved problems to display.        Brief Hospital Course to date:  Shaun Tariq is a 65 y.o. male with no past medical history on no medications who presented with progressive SOA/cough x 4 days with night sweats for several months.      Sepsis - fever, WBC,  source (POA)  PNA/cavitary lung lesion   Night sweats   Fever - improved   - CTA with 6 cm cavitary lesion in lateral right lung   - likely infectious with other symptoms but neoplasm can't be ruled out   - ID consulted   - BCx NGTD; strep/legionella negative, MRSA negative; fungal workup pending, AFB x 1 negative with 2 pending   - TB quantiferon pending   - Continue airborne isolation   - Once TB ruled out will need CT guided biopsy to rule out neoplasm and obtain additional workup  - Continue zosyn - zyvox discontinued (6/18)  - PRN cough medication with improvement       Mild hyponatremia   - Likely related to volume depletion -- improved with fluids      ETOH use  - 5-7 beers per night   - CIWA protocol  - has required 1 dosing of ativan 6/17     Thrombocytosis  - Likely reactive; monitor     DVT prophylaxis:  Mechanical DVT prophylaxis orders are present.       Disposition: I expect the patient to be discharged TBD    CODE STATUS:   Code Status and Medical Interventions:   Ordered at: 06/15/21 2014     Code Status:    CPR     Medical Interventions (Level of Support Prior to Arrest):    Full       Rosibel Renee,   06/18/21

## 2021-06-18 NOTE — PLAN OF CARE
Goal Outcome Evaluation:  Plan of Care Reviewed With: patient           Outcome Summary: VSS, room air, iv abx continue, ativan given for CIWA no issues

## 2021-06-19 LAB
A FLAVUS AB SER QL ID: NEGATIVE
A FUMIGATUS AB SER QL ID: NEGATIVE
A NIGER AB SER QL ID: NEGATIVE
B DERMAT AB TITR SER: NEGATIVE {TITER}
GAMMA INTERFERON BACKGROUND BLD IA-ACNC: 0 IU/ML
H CAPSUL MYC AB TITR SER CF: NEGATIVE {TITER}
H CAPSUL YST AB TITR SER CF: ABNORMAL {TITER}
M TB IFN-G BLD-IMP: NEGATIVE
M TB IFN-G CD4+ BCKGRND COR BLD-ACNC: 0 IU/ML
M TB IFN-G CD4+CD8+ BCKGRND COR BLD-ACNC: 0 IU/ML
MITOGEN IGNF BLD-ACNC: >10 IU/ML
MVISTA(R) BLASTOMYCES AG: NORMAL NG/ML
SERVICE CMNT-IMP: NORMAL
SPECIMEN SOURCE: NORMAL

## 2021-06-19 PROCEDURE — 25010000002 PIPERACILLIN SOD-TAZOBACTAM PER 1 G: Performed by: INTERNAL MEDICINE

## 2021-06-19 PROCEDURE — 99232 SBSQ HOSP IP/OBS MODERATE 35: CPT | Performed by: INTERNAL MEDICINE

## 2021-06-19 RX ADMIN — GUAIFENESIN 600 MG: 600 TABLET, EXTENDED RELEASE ORAL at 08:21

## 2021-06-19 RX ADMIN — Medication 1 CAPSULE: at 08:21

## 2021-06-19 RX ADMIN — Medication 250 MG: at 08:21

## 2021-06-19 RX ADMIN — SODIUM CHLORIDE, PRESERVATIVE FREE 10 ML: 5 INJECTION INTRAVENOUS at 20:39

## 2021-06-19 RX ADMIN — TAZOBACTAM SODIUM AND PIPERACILLIN SODIUM 3.38 G: 375; 3 INJECTION, SOLUTION INTRAVENOUS at 20:39

## 2021-06-19 RX ADMIN — TAZOBACTAM SODIUM AND PIPERACILLIN SODIUM 3.38 G: 375; 3 INJECTION, SOLUTION INTRAVENOUS at 14:04

## 2021-06-19 RX ADMIN — SODIUM CHLORIDE, PRESERVATIVE FREE 10 ML: 5 INJECTION INTRAVENOUS at 08:22

## 2021-06-19 RX ADMIN — TAZOBACTAM SODIUM AND PIPERACILLIN SODIUM 3.38 G: 375; 3 INJECTION, SOLUTION INTRAVENOUS at 05:46

## 2021-06-19 RX ADMIN — HYDROCODONE POLISTIREX AND CHLORPHENIRAMINE POLISTIREX 2.5 ML: 10; 8 SUSPENSION, EXTENDED RELEASE ORAL at 20:44

## 2021-06-19 RX ADMIN — GUAIFENESIN 600 MG: 600 TABLET, EXTENDED RELEASE ORAL at 20:39

## 2021-06-19 RX ADMIN — Medication 250 MG: at 20:39

## 2021-06-19 NOTE — PLAN OF CARE
Goal Outcome Evaluation:  Plan of Care Reviewed With: patient        Progress: no change   VSS, on RA. IV abx given. No complaints of pain or discomfort. CIWA score 0. Pt planned to have biopsy done of lung on Monday. Will continue to monitor.

## 2021-06-19 NOTE — PROGRESS NOTES
Infectious Disease Progress note  Shaun Tariq  1955  [id number]    Date of Consult: 6/16/2021  Admission Date: 6/15/2021    Requesting Provider: Dr Campbell  Evaluating Physician: Myles Musa MD    Chief Complaint: cough, dyspnea    Reason for Consultation: cavitary lung lesion    History of present illness:   Patient is a 65 y.o.  Yr old male with history of ongoing alcohol abuse (5-7 beers daily), and 30 pack year smoking history.  He presented to urgent care complaining of 4 days progress cough, sputum production and fever to 102. He complains of drenching night sweats for 3 months.  No weight loss admitted and started on meropenem. Febrile to 101.1 here. Not hypoxic on room air. COVID and flu negative. WBC 12. Normal lactate, procal.  Patient denies any known exposures to tuberculosis.  No history of international travel,  service, homelessness or incarceration.  He does have a pet dog but is not around any other animals including birds.  He has lived in [place] nearly his whole life.  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  He complains of poor appetite, nausea and emesis    6/17/21: Feels better today with less dyspnea.  Cough became productive with hypertonic saline and he has produced a least 2 sputum specimens per patient.  Currently not hypoxic off oxygen and afebrile overnight.  Less chest discomfort    6/18/21: Feels better again today.  Off oxygen.  Afebrile.  Less dyspnea, cough.  Tolerating current IV antibiotics.  Patient says he is currently uninsured, but working to get Medicaid coverage    6/19/21: Isolation cleared yesterday after AFB sputum smears negative the patient had already eaten so was unable to have lung biopsy done.  Now scheduled for Monday.  Patient continues to do well without worsening cough, dyspnea, fevers etc.  Tolerating IV antibiotics    Review of Systems  See above. Otherwise No n/v/d. No rash. No new ADR to Abx.        No Known Allergies    Antibiotics:  Anti-Infectives (From admission, onward)    Ordered     Dose/Rate Route Frequency Start Stop    06/16/21 0945  piperacillin-tazobactam (ZOSYN) 3.375 g in iso-osmotic dextrose 50 ml (premix)     Ordering Provider: Myles Musa MD    3.375 g  over 4 Hours Intravenous Every 8 Hours 06/16/21 1400 06/26/21 1359    06/15/21 2252  meropenem (MERREM) 1 g/100 mL 0.9% NS VTB (mbp)     Ordering Provider: Torrey Campbell MD    1 g  over 30 Minutes Intravenous Once 06/15/21 2345 06/16/21 0016    06/15/21 1932  cefTRIAXone (ROCEPHIN) 1 g/100 mL 0.9% NS (MBP)     Ordering Provider: Sharon Garcia APRN    1 g  over 30 Minutes Intravenous Once 06/15/21 1934 06/15/21 2205          Other Medications:  Current Facility-Administered Medications   Medication Dose Route Frequency Provider Last Rate Last Admin   • acetaminophen (TYLENOL) tablet 650 mg  650 mg Oral Q6H PRN Torrey Campbell MD   650 mg at 06/16/21 0554   • calcium carbonate (TUMS) chewable tablet 500 mg (200 mg elemental)  2 tablet Oral TID PRN Nickie Johnson PA-C   2 tablet at 06/17/21 2203   • guaiFENesin (MUCINEX) 12 hr tablet 600 mg  600 mg Oral Q12H Torrey Campbell MD   600 mg at 06/19/21 0821   • HYDROcod Polst-CPM Polst ER (TUSSIONEX PENNKINETIC) 10-8 MG/5ML ER suspension 2.5 mL  2.5 mL Oral Q12H PRN Rosibel Renee DO   2.5 mL at 06/17/21 2056   • lactobacillus acidophilus (RISAQUAD) capsule 1 capsule  1 capsule Oral Daily Myles Musa MD   1 capsule at 06/19/21 0821   • LORazepam (ATIVAN) tablet 1 mg  1 mg Oral Q2H PRN Rosibel Renee DO   1 mg at 06/17/21 2202    Or   • LORazepam (ATIVAN) injection 1 mg  1 mg Intravenous Q2H PRN Rosibel Renee DO        Or   • LORazepam (ATIVAN) tablet 2 mg  2 mg Oral Q1H PRN Rosibel Renee DO        Or   • LORazepam (ATIVAN) injection 2 mg  2 mg Intravenous Q1H PRN Rosibel Renee DO        Or   • LORazepam (ATIVAN) injection 2 mg  2 mg Intravenous Q15 Min  "PRN Rosibel Renee DO        Or   • LORazepam (ATIVAN) injection 2 mg  2 mg Intramuscular Q15 Min PRN Rosibel Renee DO       • ondansetron (ZOFRAN) tablet 4 mg  4 mg Oral Q6H PRN Torrey Campbell MD        Or   • ondansetron (ZOFRAN) injection 4 mg  4 mg Intravenous Q6H PRN Torrey Campbell MD   4 mg at 06/16/21 1304   • piperacillin-tazobactam (ZOSYN) 3.375 g in iso-osmotic dextrose 50 ml (premix)  3.375 g Intravenous Q8H Myles Musa MD   3.375 g at 06/19/21 1404   • saccharomyces boulardii (FLORASTOR) capsule 250 mg  250 mg Oral BID Torrey Campbell MD   250 mg at 06/19/21 0821   • sodium chloride 0.9 % flush 10 mL  10 mL Intravenous PRN Torrey Campbell MD       • sodium chloride 0.9 % flush 10 mL  10 mL Intravenous Q12H Torrey Campbell MD   10 mL at 06/19/21 0822   • sodium chloride 0.9 % flush 10 mL  10 mL Intravenous PRN Torrey Campbell MD           Physical Exam:   Vital Signs   /91 (BP Location: Right arm, Patient Position: Lying)   Pulse 87   Temp 98.2 °F (36.8 °C) (Oral)   Resp 18   Ht 172.7 cm (68\")   Wt 90.7 kg (200 lb)   SpO2 91%   BMI 30.41 kg/m²     GENERAL: Awake and alert, reasonably comfortable appearing  HEENT: Normocephalic, atraumatic.  EOMI. No conjunctival injection. No icterus. Oropharynx clear without evidence of thrush or exudate.  Fair dentition  NECK: Supple without nuchal rigidity. No mass.  HEART: RRR; No murmur.  LUNGS: Diminished at right base, improved.  No cough noted today   ABDOMEN: Soft, nontender, nondistended. Obese. No rebound or guarding.  EXT:  No cyanosis, clubbing or edema.  : Without Yanes catheter.  MSK: FROM without joint effusions noted arms/legs.    SKIN: Warm and dry without cutaneous eruptions on Inspection/palpation.    NEURO: Oriented to PPT. No focal deficits on motor/sensory exam at arms/legs.  PSYCHIATRIC: Normal insight and judgement. Cooperative with PE  TODD    Laboratory Data    Results from last 7 days   Lab Units " 06/18/21  0412 06/17/21  0508 06/16/21  0455   WBC 10*3/mm3 6.18 9.12 10.59   HEMOGLOBIN g/dL 10.7* 11.4* 10.9*   HEMATOCRIT % 34.1* 35.5* 35.1*   PLATELETS 10*3/mm3 490* 484* 475*     Results from last 7 days   Lab Units 06/17/21  0508   SODIUM mmol/L 136   POTASSIUM mmol/L 4.0   CHLORIDE mmol/L 101   CO2 mmol/L 26.0   BUN mg/dL 9   CREATININE mg/dL 0.90   GLUCOSE mg/dL 97   CALCIUM mg/dL 8.9     Estimated Creatinine Clearance: 91.9 mL/min (by C-G formula based on SCr of 0.9 mg/dL).  Results from last 7 days   Lab Units 06/15/21  1512   ALK PHOS U/L 81   BILIRUBIN mg/dL 0.3   ALT (SGPT) U/L 20   AST (SGOT) U/L 31               Microbiology:  COVID and flu negative  Blood cx pending, so far negative  Urine strep and legionella antigens negative  Respiratory culture with normal trudi  Urine blastomycosis antigen negative  MRSA nares screening negative  AFB smear x3 negative. Cultures pending  Fungal culture pending  QuantiFERON negative    Radiology:  XR Chest 2 View    Result Date: 6/15/2021  Findings concerning for cavitating pneumonia in the right midlung.  This report was finalized on 6/15/2021 2:04 PM by Linden Real.      CT Angiogram Chest    Result Date: 6/15/2021  Impression: 1. No evidence of pulmonary embolus on this study. 2. 6 cm cavitary lesion in the lateral aspect of the right lower lobe. The differential diagnosis would be a cavitating pneumonia/abscess versus a cavitating neoplasm. Signer Name: Thiago Quinn MD  Signed: 6/15/2021 7:17 PM  Workstation Name: [id number]  [facility] personally reviewed the above CT images. Large thick walled RLL cavitary lesion. No other focal infiltrate      Impression:   1. Right lower lobe cavitary lesion: 6 cm on CT. Thick walled.  Differential includes bacterial pneumonia, fungal pneumonia, tuberculosis, malignancy.  Could be malignancy with secondary infection. less likely vasculitis with his age.   No specific risk factors for  tuberculosis identified but with his history of several months of night sweats and his imaging findings we certainly need to rule it out.  Clinically are negative and 3 AFB sputum's ears negative so cleared from isolation.  AFB cultures pending.  Fungal work-up pending.  Improving over 48 hours with antibacterial coverage alone makes bacterial etiology most likely.  2. Fever, sepsis, leukocytosis   3. Night sweats  4. Former smoker  5. Ongoing alcohol abuse    PLAN:   - Airborne isolation discontinued    - Fungal work-up including aspergillosis, histoplasmosis and blastomycosis antigens and antibodies pending  - Follow-up pending blood cultures  - Follow CBC, CMP    -Patient unable to have CT-guided biopsy done yesterday since he had already eaten.  Now rescheduled for Monday. Need tissue cultures (bacterial, anaerobic, fungal, AFB) and specimen for pathology to screen for malignancy.  Orders placed for cultures and path     - Continue empiric Zosyn for now  - probiotic    Continue inpatient monitoring over the weekend and IV antibiotics.  After biopsy is completed we will consider  de-escalation to oral Augmentin alone to facilitate discharge home     Highly complex MDM.  Discussed with patient at bedside today. Also with RN.  I will follow over the weekend    Myles Musa MD  6/19/2021

## 2021-06-19 NOTE — PROGRESS NOTES
[facility] Medicine Services  PROGRESS NOTE    Patient Name: Shaun Tariq  : 1955  MRN: [id number]    Date of Admission: 6/15/2021  Primary Care Physician: Cabrera Temple MD    Subjective   Subjective     CC:  Cough, night sweats    HPI:  States that he is feeling better today.  Cough is improved.   Reviewed plan for antibiotics as well as CT-guided biopsy.    ROS:  Gen- No fevers, chills  CV- No chest pain, palpitations  Resp- No cough, dyspnea  GI- No N/V/D, abd pain     Objective   Objective     Vital Signs:   Temp:  [98.3 °F (36.8 °C)-99 °F (37.2 °C)] 99 °F (37.2 °C)  Heart Rate:  [62-87] 77  Resp:  [18] 18  BP: (131-147)/(70-92) 140/92        Physical Exam:  Constitutional: No acute distress, awake, alert  HENT: NCAT, mucous membranes moist  Respiratory: Clear to auscultation bilaterally, respiratory effort normal   Cardiovascular: RRR, no murmurs, rubs, or gallops  Gastrointestinal: Positive bowel sounds, soft, nontender, nondistended  Musculoskeletal: No bilateral ankle edema  Psychiatric: Appropriate affect, cooperative  Neurologic: Oriented x 3, strength symmetric in all extremities, Cranial Nerves grossly intact to confrontation, speech clear  Skin: No rashes    Results Reviewed:  Results from last 7 days   Lab Units 21  0412 21  0508 21  1023 21  0455 06/15/21  1512   WBC 10*3/mm3 6.18 9.12  --  10.59 12.18*   HEMOGLOBIN g/dL 10.7* 11.4*  --  10.9* 13.0   HEMATOCRIT % 34.1* 35.5*  --  35.1* 39.7   PLATELETS 10*3/mm3 490* 484*  --  475* 490*   INR   --   --  1.11  --   --    PROCALCITONIN ng/mL  --   --   --   --  0.05     Results from last 7 days   Lab Units 21  0508 21  1426 21  0455 06/15/21  1512   SODIUM mmol/L 136 133* 131* 133*   POTASSIUM mmol/L 4.0  --  3.6 4.0   CHLORIDE mmol/L 101  --  98 97*   CO2 mmol/L 26.0  --  22.0 22.0   BUN mg/dL 9  --  14 15   CREATININE mg/dL 0.90  --  0.72* 0.88   GLUCOSE mg/dL 97   --  91 133*   CALCIUM mg/dL 8.9  --  8.7 9.9   ALT (SGPT) U/L  --   --   --  20   AST (SGOT) U/L  --   --   --  31   TROPONIN T ng/mL  --   --   --  <0.010   PROBNP pg/mL  --   --   --  165.1     Estimated Creatinine Clearance: 91.9 mL/min (by C-G formula based on SCr of 0.9 mg/dL).    Microbiology Results Abnormal     Procedure Component Value - Date/Time    Blood Culture - Blood, Arm, Left [[id number]] Collected: 06/15/21 1800    Lab Status: Preliminary result Specimen: Blood from Arm, Left Updated: 06/18/21 1830     Blood Culture No growth at 3 days    Blood Culture - Blood, Arm, Right [[id number]] Collected: 06/15/21 1740    Lab Status: Preliminary result Specimen: Blood from Arm, Right Updated: 06/18/21 1830     Blood Culture No growth at 3 days    Respiratory Culture - Sputum, Cough [[id number]] Collected: 06/16/21 1132    Lab Status: Final result Specimen: Sputum from Cough Updated: 06/18/21 1312     Respiratory Culture Scant growth (1+) Normal Respiratory Antonieta: NO S.aureus/MRSA or Pseudomonas aeruginosa     Gram Stain Many (4+) WBCs per low power field      Few (2+) Epithelial cells per low power field      Few (2+) Gram positive cocci in pairs and clusters      Rare (1+) Gram negative bacilli    AFB Culture - Sputum, Cough [[id number]] Collected: 06/17/21 1247    Lab Status: Preliminary result Specimen: Sputum from Cough Updated: 06/18/21 1240     AFB Stain No acid fast bacilli seen on concentrated smear    AFB Culture - Sputum, Cough [[id number]] Collected: 06/17/21 1959    Lab Status: Preliminary result Specimen: Sputum from Cough Updated: 06/18/21 1240     AFB Stain No acid fast bacilli seen on concentrated smear    AFB Culture - Sputum, Cough [[id number]] Collected: 06/16/21 1132    Lab Status: Preliminary result Specimen: Sputum from Cough Updated: 06/17/21 1347     AFB Stain No acid fast bacilli seen on concentrated smear    MRSA Screen, PCR (Inpatient) - Swab, Nares [[id number]]  (Normal) Collected: 06/16/21  1810    Lab Status: Final result Specimen: Swab from Nares Updated: 06/16/21 2201     MRSA PCR Negative    Narrative:      MRSA Negative    S. Pneumo Ag Urine or CSF - Urine, Urine, Clean Catch [[id number]]  (Normal) Collected: 06/16/21 0955    Lab Status: Final result Specimen: Urine, Clean Catch Updated: 06/16/21 1458     Strep Pneumo Ag Negative    Legionella Antigen, Urine - Urine, Urine, Clean Catch [[id number]]  (Normal) Collected: 06/16/21 0955    Lab Status: Final result Specimen: Urine, Clean Catch Updated: 06/16/21 1458     LEGIONELLA ANTIGEN, URINE Negative    COVID-19 and FLU A/B PCR - Swab, Nasopharynx [[id number]]  (Normal) Collected: 06/15/21 1722    Lab Status: Final result Specimen: Swab from Nasopharynx Updated: 06/15/21 1801     COVID19 Not Detected     Influenza A PCR Not Detected     Influenza B PCR Not Detected    Narrative:      Fact sheet for providers: https://www.fda.gov/media/231287/download    Fact sheet for patients: https://www.fda.gov/media/083882/download    Test performed by PCR.          Imaging Results (Last 24 Hours)     ** No results found for the last 24 hours. **              I have reviewed the medications:  Scheduled Meds:guaiFENesin, 600 mg, Oral, Q12H  lactobacillus acidophilus, 1 capsule, Oral, Daily  piperacillin-tazobactam, 3.375 g, Intravenous, Q8H  saccharomyces boulardii, 250 mg, Oral, BID  sodium chloride, 10 mL, Intravenous, Q12H      Continuous Infusions:   PRN Meds:.•  acetaminophen  •  calcium carbonate  •  HYDROcod Polst-CPM Polst ER  •  LORazepam **OR** LORazepam **OR** LORazepam **OR** LORazepam **OR** LORazepam **OR** LORazepam  •  ondansetron **OR** ondansetron  •  sodium chloride  •  sodium chloride    Assessment/Plan   Assessment & Plan     Active Hospital Problems    Diagnosis  POA   • **Pneumonia of right lower lobe due to infectious organism [J18.9]  Yes   • Thrombocytosis (CMS/HCC) [D47.3]  Unknown   • Sepsis (CMS/[facility]) [A41.9]  Unknown   • Hyponatremia  [E87.1]  Unknown   • Night sweats [R61]  Unknown      Resolved Hospital Problems   No resolved problems to display.        Brief Hospital Course to date:  Shaun Tariq is a 65 y.o. male with no past medical history on no medications who presented with progressive SOA/cough x 4 days with night sweats for several months.      Sepsis - fever, WBC, source (POA)  PNA/cavitary lung lesion   Night sweats   Fever - improved   - CTA with 6 cm cavitary lesion in lateral right lung   - likely infectious with other symptoms but neoplasm can't be ruled out   - ID consulted   - BCx NGTD; strep/legionella negative, MRSA negative; fungal workup pending, AFB x 3 negative  - TB quantiferon negative   - Patient initially in airborne isolation which has been removed with negative TB workup  - Continue zosyn   - CT guided biopsy Monday -- to send for tissue culture (bacterial, anaerobic, fungal, AFB) and pathology   - PRN cough medication with improvement       Mild hyponatremia   - Likely related to volume depletion -- improved with fluids      ETOH use  - 5-7 beers per night   - CIWA protocol  -- currently doing well      Thrombocytosis  - Likely reactive; monitor      DVT prophylaxis:  Mechanical DVT prophylaxis orders are present.         Disposition: I expect the patient to be discharged TBD - lung biopsy on Monday     CODE STATUS:   Code Status and Medical Interventions:   Ordered at: 06/15/21 2014     Code Status:    CPR     Medical Interventions (Level of Support Prior to Arrest):    Tressa Renee,   06/19/21

## 2021-06-20 LAB
BACTERIA SPEC AEROBE CULT: NORMAL
BACTERIA SPEC AEROBE CULT: NORMAL
DEPRECATED RDW RBC AUTO: 52.2 FL (ref 37–54)
ERYTHROCYTE [DISTWIDTH] IN BLOOD BY AUTOMATED COUNT: 14.1 % (ref 12.3–15.4)
HCT VFR BLD AUTO: 37.1 % (ref 37.5–51)
HGB BLD-MCNC: 11.7 G/DL (ref 13–17.7)
MCH RBC QN AUTO: 31.3 PG (ref 26.6–33)
MCHC RBC AUTO-ENTMCNC: 31.5 G/DL (ref 31.5–35.7)
MCV RBC AUTO: 99.2 FL (ref 79–97)
PLATELET # BLD AUTO: 622 10*3/MM3 (ref 140–450)
PMV BLD AUTO: 9.4 FL (ref 6–12)
RBC # BLD AUTO: 3.74 10*6/MM3 (ref 4.14–5.8)
WBC # BLD AUTO: 6.43 10*3/MM3 (ref 3.4–10.8)

## 2021-06-20 PROCEDURE — 25010000002 PIPERACILLIN SOD-TAZOBACTAM PER 1 G: Performed by: INTERNAL MEDICINE

## 2021-06-20 PROCEDURE — 85027 COMPLETE CBC AUTOMATED: CPT | Performed by: INTERNAL MEDICINE

## 2021-06-20 PROCEDURE — 99232 SBSQ HOSP IP/OBS MODERATE 35: CPT | Performed by: INTERNAL MEDICINE

## 2021-06-20 RX ADMIN — GUAIFENESIN 600 MG: 600 TABLET, EXTENDED RELEASE ORAL at 08:17

## 2021-06-20 RX ADMIN — GUAIFENESIN 600 MG: 600 TABLET, EXTENDED RELEASE ORAL at 21:22

## 2021-06-20 RX ADMIN — Medication 250 MG: at 21:22

## 2021-06-20 RX ADMIN — TAZOBACTAM SODIUM AND PIPERACILLIN SODIUM 3.38 G: 375; 3 INJECTION, SOLUTION INTRAVENOUS at 05:11

## 2021-06-20 RX ADMIN — SODIUM CHLORIDE, PRESERVATIVE FREE 10 ML: 5 INJECTION INTRAVENOUS at 21:23

## 2021-06-20 RX ADMIN — Medication 250 MG: at 08:17

## 2021-06-20 RX ADMIN — TAZOBACTAM SODIUM AND PIPERACILLIN SODIUM 3.38 G: 375; 3 INJECTION, SOLUTION INTRAVENOUS at 21:24

## 2021-06-20 RX ADMIN — TAZOBACTAM SODIUM AND PIPERACILLIN SODIUM 3.38 G: 375; 3 INJECTION, SOLUTION INTRAVENOUS at 14:53

## 2021-06-20 RX ADMIN — Medication 1 CAPSULE: at 08:17

## 2021-06-20 NOTE — PROGRESS NOTES
[facility] Medicine Services  PROGRESS NOTE    Patient Name: Shaun Tariq  : 1955  MRN: [id number]    Date of Admission: 6/15/2021  Primary Care Physician: Cabrera Temple MD    Subjective   Subjective     CC:  Fever, night sweats     HPI:  States that he is feeling fine today. Cough is improved. Reviewed plan for biopsy tomorrow.     ROS:  Gen- No fevers, chills  CV- No chest pain, palpitations  Resp- No cough, dyspnea  GI- No N/V/D, abd pain     Objective   Objective     Vital Signs:   Temp:  [98.1 °F (36.7 °C)-98.5 °F (36.9 °C)] 98.1 °F (36.7 °C)  Heart Rate:  [58-97] 73  Resp:  [18] 18  BP: (121-154)/(80-96) 154/87        Physical Exam:  Constitutional: No acute distress, awake, alert  HENT: NCAT, mucous membranes moist  Respiratory: Clear to auscultation bilaterally, respiratory effort normal   Cardiovascular: RRR, no murmurs, rubs, or gallops  Gastrointestinal: Positive bowel sounds, soft, nontender, nondistended  Musculoskeletal: No bilateral ankle edema  Psychiatric: Appropriate affect, cooperative  Neurologic: Oriented x 3, strength symmetric in all extremities, Cranial Nerves grossly intact to confrontation, speech clear  Skin: No rashes    Results Reviewed:  Results from last 7 days   Lab Units 21  0412 21  0508 21  1023 21  0455 06/15/21  1512   WBC 10*3/mm3 6.18 9.12  --  10.59 12.18*   HEMOGLOBIN g/dL 10.7* 11.4*  --  10.9* 13.0   HEMATOCRIT % 34.1* 35.5*  --  35.1* 39.7   PLATELETS 10*3/mm3 490* 484*  --  475* 490*   INR   --   --  1.11  --   --    PROCALCITONIN ng/mL  --   --   --   --  0.05     Results from last 7 days   Lab Units 21  0508 21  1426 21  0455 06/15/21  1512   SODIUM mmol/L 136 133* 131* 133*   POTASSIUM mmol/L 4.0  --  3.6 4.0   CHLORIDE mmol/L 101  --  98 97*   CO2 mmol/L 26.0  --  22.0 22.0   BUN mg/dL 9  --  14 15   CREATININE mg/dL 0.90  --  0.72* 0.88   GLUCOSE mg/dL 97  --  91 133*   CALCIUM  mg/dL 8.9  --  8.7 9.9   ALT (SGPT) U/L  --   --   --  20   AST (SGOT) U/L  --   --   --  31   TROPONIN T ng/mL  --   --   --  <0.010   PROBNP pg/mL  --   --   --  165.1     Estimated Creatinine Clearance: 91.9 mL/min (by C-G formula based on SCr of 0.9 mg/dL).    Microbiology Results Abnormal     Procedure Component Value - Date/Time    Blood Culture - Blood, Arm, Left [[id number]] Collected: 06/15/21 1800    Lab Status: Preliminary result Specimen: Blood from Arm, Left Updated: 06/19/21 1830     Blood Culture No growth at 4 days    Blood Culture - Blood, Arm, Right [[id number]] Collected: 06/15/21 1740    Lab Status: Preliminary result Specimen: Blood from Arm, Right Updated: 06/19/21 1830     Blood Culture No growth at 4 days    Blastomyces Antigen - Urine, Urine, Clean Catch [[id number]] Collected: 06/16/21 0955    Lab Status: Final result Specimen: Urine, Clean Catch Updated: 06/19/21 0912     MVista(R) Blastomyces Ag None Detected ng/mL      Comment: Results reported as ng/mL in 0.2 - 14.7 ng/mL range  Results above the limit of detection but below 0.2 ng/mL  are reported as 'Positive, Below the Limit of  Quantification'  Results above 14.7 ng/mL are reported as 'Positive,  Above the Limit of Quantification'        Specimen Type URINE    Narrative:      Performed at:  [address] [place] [address] [facility]  [address] [place] [address] [place]  [id number]  : Zachariah Peterson MD, Phone:  [phone]    Respiratory Culture - Sputum, Cough [[id number]] Collected: 06/16/21 1132    Lab Status: Final result Specimen: Sputum from Cough Updated: 06/18/21 1312     Respiratory Culture Scant growth (1+) Normal Respiratory Antonieta: NO S.aureus/MRSA or Pseudomonas aeruginosa     Gram Stain Many (4+) WBCs per low power field      Few (2+) Epithelial cells per low power field      Few (2+) Gram positive cocci in pairs and clusters      Rare (1+) Gram negative bacilli    AFB Culture - Sputum, Cough [[id number]] Collected: 06/17/21 1247     Lab Status: Preliminary result Specimen: Sputum from Cough Updated: 06/18/21 1240     AFB Stain No acid fast bacilli seen on concentrated smear    AFB Culture - Sputum, Cough [[id number]] Collected: 06/17/21 1959    Lab Status: Preliminary result Specimen: Sputum from Cough Updated: 06/18/21 1240     AFB Stain No acid fast bacilli seen on concentrated smear    AFB Culture - Sputum, Cough [[id number]] Collected: 06/16/21 1132    Lab Status: Preliminary result Specimen: Sputum from Cough Updated: 06/17/21 1347     AFB Stain No acid fast bacilli seen on concentrated smear    MRSA Screen, PCR (Inpatient) - Swab, Nares [[id number]]  (Normal) Collected: 06/16/21 1810    Lab Status: Final result Specimen: Swab from Nares Updated: 06/16/21 2201     MRSA PCR Negative    Narrative:      MRSA Negative    S. Pneumo Ag Urine or CSF - Urine, Urine, Clean Catch [[id number]]  (Normal) Collected: 06/16/21 0955    Lab Status: Final result Specimen: Urine, Clean Catch Updated: 06/16/21 1458     Strep Pneumo Ag Negative    Legionella Antigen, Urine - Urine, Urine, Clean Catch [[id number]]  (Normal) Collected: 06/16/21 0955    Lab Status: Final result Specimen: Urine, Clean Catch Updated: 06/16/21 1458     LEGIONELLA ANTIGEN, URINE Negative    COVID-19 and FLU A/B PCR - Swab, Nasopharynx [[id number]]  (Normal) Collected: 06/15/21 1722    Lab Status: Final result Specimen: Swab from Nasopharynx Updated: 06/15/21 1801     COVID19 Not Detected     Influenza A PCR Not Detected     Influenza B PCR Not Detected    Narrative:      Fact sheet for providers: https://www.fda.gov/media/007461/download    Fact sheet for patients: https://www.fda.gov/media/401172/download    Test performed by PCR.          Imaging Results (Last 24 Hours)     ** No results found for the last 24 hours. **              I have reviewed the medications:  Scheduled Meds:guaiFENesin, 600 mg, Oral, Q12H  lactobacillus acidophilus, 1 capsule, Oral, Daily  piperacillin-tazobactam,  3.375 g, Intravenous, Q8H  saccharomyces boulardii, 250 mg, Oral, BID  sodium chloride, 10 mL, Intravenous, Q12H      Continuous Infusions:   PRN Meds:.•  acetaminophen  •  calcium carbonate  •  HYDROcod Polst-CPM Polst ER  •  LORazepam **OR** LORazepam **OR** LORazepam **OR** LORazepam **OR** LORazepam **OR** LORazepam  •  ondansetron **OR** ondansetron  •  sodium chloride  •  sodium chloride    Assessment/Plan   Assessment & Plan     Active Hospital Problems    Diagnosis  POA   • **Pneumonia of right lower lobe due to infectious organism [J18.9]  Yes   • Thrombocytosis (CMS/HCC) [D47.3]  Unknown   • Sepsis (CMS/HCC) [A41.9]  Unknown   • Hyponatremia [E87.1]  Unknown   • Night sweats [R61]  Unknown      Resolved Hospital Problems   No resolved problems to display.        Brief Hospital Course to date:  Shaun Tariq is a 65 y.o. male with no past medical history on no medications who presented with progressive SOA/cough x 4 days with night sweats for several months.      Sepsis - fever, WBC, source (POA)  PNA/cavitary lung lesion   Night sweats   Fever - improved   - CTA with 6 cm cavitary lesion in lateral right lung   - likely infectious with other symptoms but neoplasm can't be ruled out   - ID consulted   - BCx NGTD; strep/legionella negative, MRSA negative; fungal workup pending, AFB x 3 negative  - TB quantiferon negative   - Patient initially in airborne isolation which has been removed with negative TB workup  - Continue zosyn; continue probiotic   - CT guided biopsy Monday -- to send for tissue culture (bacterial, anaerobic, fungal, AFB) and pathology -- NPO at midnight   - Likely home after biopsy on Augmentin alone with follow up in 1-2 weeks       Mild hyponatremia - resolved   - Likely related to volume depletion -- improved with fluids      ETOH use  - 5-7 beers per night   - CIWA protocol  -- currently doing well      Thrombocytosis  - Likely reactive; monitor -- will need outpatient monitoring      Tobacco use  - Encourage cessation      DVT prophylaxis:  Mechanical DVT prophylaxis orders are present.         Disposition: I expect the patient to be discharged TBD - lung biopsy on Monday     CODE STATUS:   Code Status and Medical Interventions:   Ordered at: 06/15/21 2014     Code Status:    CPR     Medical Interventions (Level of Support Prior to Arrest):    Full       Rosibel Renee DO  06/20/21

## 2021-06-20 NOTE — PROGRESS NOTES
Infectious Disease Progress note  Shaun Tariq  1955  [id number]    Date of Consult: 6/16/2021  Admission Date: 6/15/2021    Requesting Provider: Dr Campbell  Evaluating Physician: Myles Musa MD    Chief Complaint: cough, dyspnea    Reason for Consultation: cavitary lung lesion    History of present illness:   Patient is a 65 y.o.  Yr old male with history of ongoing alcohol abuse (5-7 beers daily), and 30 pack year smoking history.  He presented to urgent care complaining of 4 days progress cough, sputum production and fever to 102. He complains of drenching night sweats for 3 months.  No weight loss admitted and started on meropenem. Febrile to 101.1 here. Not hypoxic on room air. COVID and flu negative. WBC 12. Normal lactate, procal.  Patient denies any known exposures to tuberculosis.  No history of international travel,  service, homelessness or incarceration.  He does have a pet dog but is not around any other animals including birds.  He has lived in [place] nearly his whole life.  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  He complains of poor appetite, nausea and emesis    6/17/21: Feels better today with less dyspnea.  Cough became productive with hypertonic saline and he has produced a least 2 sputum specimens per patient.  Currently not hypoxic off oxygen and afebrile overnight.  Less chest discomfort    6/18/21: Feels better again today.  Off oxygen.  Afebrile.  Less dyspnea, cough.  Tolerating current IV antibiotics.  Patient says he is currently uninsured, but working to get Medicaid coverage    6/19/21: Isolation cleared yesterday after AFB sputum smears negative the patient had already eaten so was unable to have lung biopsy done.  Now scheduled for Monday.  Patient continues to do well without worsening cough, dyspnea, fevers etc.  Tolerating IV antibiotics    6/20/21: He continues to do well.  In better spirits.  Resting comfortably on room  air.  Tolerating IV Zosyn.  Appetite improving.  Wife at bedside.  Patient reports no significant bird exposures.  [place] now lives in [facility].  Lifelong [place] resident.    Review of Systems  See above. Otherwise No n/v/d. No rash. No new ADR to Abx.       No Known Allergies    Antibiotics:  Anti-Infectives (From admission, onward)    Ordered     Dose/Rate Route Frequency Start Stop    06/16/21 0945  piperacillin-tazobactam (ZOSYN) 3.375 g in iso-osmotic dextrose 50 ml (premix)     Ordering Provider: Myles Musa MD    3.375 g  over 4 Hours Intravenous Every 8 Hours 06/16/21 1400 06/26/21 1359    06/15/21 2252  meropenem (MERREM) 1 g/100 mL 0.9% NS VTB (mbp)     Ordering Provider: Torrey Campbell MD    1 g  over 30 Minutes Intravenous Once 06/15/21 2345 06/16/21 0016    06/15/21 1932  cefTRIAXone (ROCEPHIN) 1 g/100 mL 0.9% NS (MBP)     Ordering Provider: Sharon Garcia APRN    1 g  over 30 Minutes Intravenous Once 06/15/21 1934 06/15/21 2205          Other Medications:  Current Facility-Administered Medications   Medication Dose Route Frequency Provider Last Rate Last Admin   • acetaminophen (TYLENOL) tablet 650 mg  650 mg Oral Q6H PRN Torrey Campbell MD   650 mg at 06/16/21 0554   • calcium carbonate (TUMS) chewable tablet 500 mg (200 mg elemental)  2 tablet Oral TID PRN Nickie Johnson PA-C   2 tablet at 06/17/21 2203   • guaiFENesin (MUCINEX) 12 hr tablet 600 mg  600 mg Oral Q12H Torrey Campbell MD   600 mg at 06/20/21 0817   • HYDROcod Polst-CPM Polst ER (TUSSIONEX PENNKINETIC) 10-8 MG/5ML ER suspension 2.5 mL  2.5 mL Oral Q12H PRN Rosibel Renee DO   2.5 mL at 06/19/21 2044   • lactobacillus acidophilus (RISAQUAD) capsule 1 capsule  1 capsule Oral Daily Myles Musa MD   1 capsule at 06/20/21 0817   • LORazepam (ATIVAN) tablet 1 mg  1 mg Oral Q2H PRN Rosibel Renee DO   1 mg at 06/17/21 2202    Or   • LORazepam (ATIVAN) injection 1 mg  1 mg Intravenous Q2H  "PRN Rosibel Renee DO        Or   • LORazepam (ATIVAN) tablet 2 mg  2 mg Oral Q1H PRN Rosibel Renee DO        Or   • LORazepam (ATIVAN) injection 2 mg  2 mg Intravenous Q1H PRN Rosibel Renee DO        Or   • LORazepam (ATIVAN) injection 2 mg  2 mg Intravenous Q15 Min PRN Rosibel Renee DO        Or   • LORazepam (ATIVAN) injection 2 mg  2 mg Intramuscular Q15 Min PRN Rosibel Renee DO       • ondansetron (ZOFRAN) tablet 4 mg  4 mg Oral Q6H PRN Torrey Campbell MD        Or   • ondansetron (ZOFRAN) injection 4 mg  4 mg Intravenous Q6H PRN Torrey Campbell MD   4 mg at 06/16/21 1304   • piperacillin-tazobactam (ZOSYN) 3.375 g in iso-osmotic dextrose 50 ml (premix)  3.375 g Intravenous Q8H Myles Musa MD   3.375 g at 06/20/21 0511   • saccharomyces boulardii (FLORASTOR) capsule 250 mg  250 mg Oral BID Torrey Campbell MD   250 mg at 06/20/21 0817   • sodium chloride 0.9 % flush 10 mL  10 mL Intravenous PRN Torrey Campbell MD       • sodium chloride 0.9 % flush 10 mL  10 mL Intravenous Q12H Torrey Campbell MD   10 mL at 06/19/21 2039   • sodium chloride 0.9 % flush 10 mL  10 mL Intravenous PRN Torrey Campbell MD           Physical Exam:   Vital Signs   /87 (BP Location: Right arm, Patient Position: Lying)   Pulse 73   Temp 98.1 °F (36.7 °C) (Oral)   Resp 18   Ht 172.7 cm (68\")   Wt 90.7 kg (200 lb)   SpO2 94%   BMI 30.41 kg/m²     GENERAL: Awake and alert, reasonably comfortable appearing  HEENT: Normocephalic, atraumatic.  EOMI. No conjunctival injection. No icterus. Oropharynx clear without evidence of thrush or exudate.  Fair dentition  NECK: Supple without nuchal rigidity. No mass.  HEART: RRR; No murmur.  LUNGS: CTAB. No cough noted today   ABDOMEN: Soft, nontender, nondistended. Obese. No rebound or guarding.  EXT:  No cyanosis, clubbing or edema.  : Without Yanes catheter.  MSK: FROM without joint effusions noted arms/legs.    SKIN: Warm and dry without cutaneous eruptions " on Inspection/palpation.    NEURO: Oriented to PPT. No focal deficits on motor/sensory exam at arms/legs.  PSYCHIATRIC: Normal insight and judgement. Cooperative with JORGE BROOKS    Laboratory Data    Results from last 7 days   Lab Units 06/18/21  0412 06/17/21  0508 06/16/21  0455   WBC 10*3/mm3 6.18 9.12 10.59   HEMOGLOBIN g/dL 10.7* 11.4* 10.9*   HEMATOCRIT % 34.1* 35.5* 35.1*   PLATELETS 10*3/mm3 490* 484* 475*     Results from last 7 days   Lab Units 06/17/21  0508   SODIUM mmol/L 136   POTASSIUM mmol/L 4.0   CHLORIDE mmol/L 101   CO2 mmol/L 26.0   BUN mg/dL 9   CREATININE mg/dL 0.90   GLUCOSE mg/dL 97   CALCIUM mg/dL 8.9     Estimated Creatinine Clearance: 91.9 mL/min (by C-G formula based on SCr of 0.9 mg/dL).  Results from last 7 days   Lab Units 06/15/21  1512   ALK PHOS U/L 81   BILIRUBIN mg/dL 0.3   ALT (SGPT) U/L 20   AST (SGOT) U/L 31               Microbiology:  COVID and flu negative  Blood cx pending, so far negative  Urine strep and legionella antigens negative  Respiratory culture with normal trudi  Urine blastomycosis antigen negative  MRSA nares screening negative  AFB smear x3 negative. Cultures pending  Fungal culture pending  QuantiFERON negative  Single Histo antibody 1:4 other negative  Aspergillus serologies negative    Radiology:  XR Chest 2 View    Result Date: 6/15/2021  Findings concerning for cavitating pneumonia in the right midlung.  This report was finalized on 6/15/2021 2:04 PM by Linden Real.      CT Angiogram Chest    Result Date: 6/15/2021  Impression: 1. No evidence of pulmonary embolus on this study. 2. 6 cm cavitary lesion in the lateral aspect of the right lower lobe. The differential diagnosis would be a cavitating pneumonia/abscess versus a cavitating neoplasm. Signer Name: Thiago Quinn MD  Signed: 6/15/2021 7:17 PM  Workstation Name: [id number]  [facility] personally reviewed the above CT images. Large thick walled RLL cavitary lesion.  No other focal infiltrate      Impression:   1. Right lower lobe cavitary lesion: 6 cm on CT. Thick walled.  Differential includes bacterial pneumonia, fungal pneumonia, tuberculosis, malignancy.  Could be malignancy with secondary infection. less likely vasculitis with his age.   No specific risk factors for tuberculosis identified but with his history of several months of night sweats and his imaging findings we certainly need to rule it out.  Clinically are negative and 3 AFB sputum's ears negative so cleared from isolation.  AFB cultures pending.  Fungal work-up pending.  Improving over 48 hours with antibacterial coverage alone makes bacterial etiology most likely.  2. 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  Not surprising in a lifelong [place] resident and most likely represents remote exposure since he is improving without antifungal coverage. Antigens pending.   3. Fever, sepsis, leukocytosis   4. Night sweats  5. Former smoker  6. Ongoing alcohol abuse    PLAN:   - Airborne isolation discontinued    - Fungal work-up including aspergillosis, histoplasmosis and blastomycosis antigens and antibodies pending  - Follow-up pending blood cultures  - Follow CBC, CMP    -  CT-guided biopsy ordered for tomorrow.  Pt NPO at MN. Need tissue cultures (bacterial, anaerobic, fungal, AFB) and specimen for pathology to screen for malignancy.  Orders placed for cultures and path     - Continue empiric Zosyn for now  - probiotic    Continue inpatient monitoring over the weekend and IV antibiotics.  After biopsy is completed we will plan to de-escalate to oral Augmentin alone to facilitate discharge home.  Follow-up in clinic with me in 1 to 2 weeks to review pending send out studies.     Highly complex MDM.  Discussed with patient at bedside today.  I will follow    Myles Musa MD  6/20/2021

## 2021-06-21 ENCOUNTER — APPOINTMENT (OUTPATIENT)
Dept: GENERAL RADIOLOGY | Facility: HOSPITAL | Age: 66
End: 2021-06-21

## 2021-06-21 ENCOUNTER — READMISSION MANAGEMENT (OUTPATIENT)
Dept: CALL CENTER | Facility: HOSPITAL | Age: 66
End: 2021-06-21

## 2021-06-21 ENCOUNTER — APPOINTMENT (OUTPATIENT)
Dept: CT IMAGING | Facility: HOSPITAL | Age: 66
End: 2021-06-21

## 2021-06-21 ENCOUNTER — TELEPHONE (OUTPATIENT)
Dept: FAMILY MEDICINE CLINIC | Facility: CLINIC | Age: 66
End: 2021-06-21

## 2021-06-21 VITALS
SYSTOLIC BLOOD PRESSURE: 110 MMHG | BODY MASS INDEX: 30.31 KG/M2 | HEART RATE: 91 BPM | OXYGEN SATURATION: 94 % | RESPIRATION RATE: 18 BRPM | TEMPERATURE: 98.2 F | DIASTOLIC BLOOD PRESSURE: 86 MMHG | HEIGHT: 68 IN | WEIGHT: 200 LBS

## 2021-06-21 PROBLEM — R61 NIGHT SWEATS: Status: RESOLVED | Noted: 2021-06-15 | Resolved: 2021-06-21

## 2021-06-21 PROBLEM — E87.1 HYPONATREMIA: Status: RESOLVED | Noted: 2021-06-16 | Resolved: 2021-06-21

## 2021-06-21 PROBLEM — A41.9 SEPSIS (HCC): Status: RESOLVED | Noted: 2021-06-16 | Resolved: 2021-06-21

## 2021-06-21 LAB
DEPRECATED RDW RBC AUTO: 53.1 FL (ref 37–54)
ERYTHROCYTE [DISTWIDTH] IN BLOOD BY AUTOMATED COUNT: 14.1 % (ref 12.3–15.4)
GALACTOMANNAN AG SPEC IA-ACNC: 0.05 INDEX (ref 0–0.49)
H CAPSUL AG SER QL IA: <0.5
HCT VFR BLD AUTO: 41.7 % (ref 37.5–51)
HGB BLD-MCNC: 13 G/DL (ref 13–17.7)
INR PPP: 1.1 (ref 0.85–1.16)
Lab: NORMAL
MCH RBC QN AUTO: 31.6 PG (ref 26.6–33)
MCHC RBC AUTO-ENTMCNC: 31.2 G/DL (ref 31.5–35.7)
MCV RBC AUTO: 101.5 FL (ref 79–97)
PLATELET # BLD AUTO: 715 10*3/MM3 (ref 140–450)
PMV BLD AUTO: 9.2 FL (ref 6–12)
PROTHROMBIN TIME: 13.9 SECONDS (ref 11.4–14.4)
RBC # BLD AUTO: 4.11 10*6/MM3 (ref 4.14–5.8)
WBC # BLD AUTO: 8 10*3/MM3 (ref 3.4–10.8)

## 2021-06-21 PROCEDURE — 25010000002 MIDAZOLAM PER 1 MG: Performed by: RADIOLOGY

## 2021-06-21 PROCEDURE — 25010000002 FENTANYL CITRATE (PF) 50 MCG/ML SOLUTION: Performed by: RADIOLOGY

## 2021-06-21 PROCEDURE — 88312 SPECIAL STAINS GROUP 1: CPT | Performed by: INTERNAL MEDICINE

## 2021-06-21 PROCEDURE — 87075 CULTR BACTERIA EXCEPT BLOOD: CPT | Performed by: INTERNAL MEDICINE

## 2021-06-21 PROCEDURE — 85027 COMPLETE CBC AUTOMATED: CPT | Performed by: INTERNAL MEDICINE

## 2021-06-21 PROCEDURE — 87205 SMEAR GRAM STAIN: CPT | Performed by: INTERNAL MEDICINE

## 2021-06-21 PROCEDURE — 87070 CULTURE OTHR SPECIMN AEROBIC: CPT | Performed by: INTERNAL MEDICINE

## 2021-06-21 PROCEDURE — 88333 PATH CONSLTJ SURG CYTO XM 1: CPT | Performed by: INTERNAL MEDICINE

## 2021-06-21 PROCEDURE — 99233 SBSQ HOSP IP/OBS HIGH 50: CPT | Performed by: FAMILY MEDICINE

## 2021-06-21 PROCEDURE — 0BBF3ZX EXCISION OF RIGHT LOWER LUNG LOBE, PERCUTANEOUS APPROACH, DIAGNOSTIC: ICD-10-PCS | Performed by: PATHOLOGY

## 2021-06-21 PROCEDURE — 87102 FUNGUS ISOLATION CULTURE: CPT | Performed by: INTERNAL MEDICINE

## 2021-06-21 PROCEDURE — 87015 SPECIMEN INFECT AGNT CONCNTJ: CPT | Performed by: INTERNAL MEDICINE

## 2021-06-21 PROCEDURE — 85610 PROTHROMBIN TIME: CPT | Performed by: INTERNAL MEDICINE

## 2021-06-21 PROCEDURE — 87176 TISSUE HOMOGENIZATION CULTR: CPT | Performed by: INTERNAL MEDICINE

## 2021-06-21 PROCEDURE — 71045 X-RAY EXAM CHEST 1 VIEW: CPT

## 2021-06-21 PROCEDURE — 87206 SMEAR FLUORESCENT/ACID STAI: CPT | Performed by: INTERNAL MEDICINE

## 2021-06-21 PROCEDURE — 25010000003 LIDOCAINE 1 % SOLUTION: Performed by: RADIOLOGY

## 2021-06-21 PROCEDURE — 25010000002 PIPERACILLIN SOD-TAZOBACTAM PER 1 G: Performed by: INTERNAL MEDICINE

## 2021-06-21 PROCEDURE — 87116 MYCOBACTERIA CULTURE: CPT | Performed by: INTERNAL MEDICINE

## 2021-06-21 RX ORDER — FENTANYL CITRATE 50 UG/ML
INJECTION, SOLUTION INTRAMUSCULAR; INTRAVENOUS
Status: COMPLETED | OUTPATIENT
Start: 2021-06-21 | End: 2021-06-21

## 2021-06-21 RX ORDER — AMOXICILLIN AND CLAVULANATE POTASSIUM 875; 125 MG/1; MG/1
1 TABLET, FILM COATED ORAL 2 TIMES DAILY
Qty: 14 TABLET | Refills: 0
Start: 2021-06-21 | End: 2021-06-28

## 2021-06-21 RX ORDER — MIDAZOLAM HYDROCHLORIDE 1 MG/ML
INJECTION INTRAMUSCULAR; INTRAVENOUS
Status: DISCONTINUED
Start: 2021-06-21 | End: 2021-06-21 | Stop reason: HOSPADM

## 2021-06-21 RX ORDER — FENTANYL CITRATE 50 UG/ML
INJECTION, SOLUTION INTRAMUSCULAR; INTRAVENOUS
Status: DISCONTINUED
Start: 2021-06-21 | End: 2021-06-21 | Stop reason: HOSPADM

## 2021-06-21 RX ORDER — LIDOCAINE HYDROCHLORIDE 10 MG/ML
20 INJECTION, SOLUTION INFILTRATION; PERINEURAL ONCE
Status: COMPLETED | OUTPATIENT
Start: 2021-06-21 | End: 2021-06-21

## 2021-06-21 RX ORDER — MIDAZOLAM HYDROCHLORIDE 1 MG/ML
INJECTION INTRAMUSCULAR; INTRAVENOUS
Status: COMPLETED | OUTPATIENT
Start: 2021-06-21 | End: 2021-06-21

## 2021-06-21 RX ORDER — SACCHAROMYCES BOULARDII 250 MG
250 CAPSULE ORAL 2 TIMES DAILY
Qty: 60 CAPSULE | Refills: 0 | Status: SHIPPED | OUTPATIENT
Start: 2021-06-21

## 2021-06-21 RX ADMIN — GUAIFENESIN 600 MG: 600 TABLET, EXTENDED RELEASE ORAL at 11:07

## 2021-06-21 RX ADMIN — FENTANYL CITRATE 50 MCG: 50 INJECTION, SOLUTION INTRAMUSCULAR; INTRAVENOUS at 10:25

## 2021-06-21 RX ADMIN — LIDOCAINE HYDROCHLORIDE 20 ML: 10 INJECTION, SOLUTION INFILTRATION; PERINEURAL at 10:02

## 2021-06-21 RX ADMIN — TAZOBACTAM SODIUM AND PIPERACILLIN SODIUM 3.38 G: 375; 3 INJECTION, SOLUTION INTRAVENOUS at 07:16

## 2021-06-21 RX ADMIN — Medication 250 MG: at 11:07

## 2021-06-21 RX ADMIN — Medication 1 CAPSULE: at 11:07

## 2021-06-21 RX ADMIN — MIDAZOLAM HYDROCHLORIDE 1 MG: 1 INJECTION, SOLUTION INTRAMUSCULAR; INTRAVENOUS at 10:25

## 2021-06-21 NOTE — TELEPHONE ENCOUNTER
Caller: NURSE    Relationship to patient: Provider    Best call back number: [phone]    New or established patient?  [x] New  [] Established    Date of discharge: 06-21-21    [facility]    Diagnosis/Symptoms: N/A    Length of stay (If applicable): N/A    Specialty Only: Did you see a [facility] provider?    [] Yes  [x] No  If so, who? N/A    PATIENT WILL BE NEW PATIENT AND IS SELF PAY

## 2021-06-21 NOTE — OUTREACH NOTE
Prep Survey      Responses   [facility] patient discharged from?  [facility]   Is LACE score < 7 ?  No   Emergency Room discharge w/ pulse ox?  No   Eligibility  [facility]   Date of Admission  06/15/21   Date of Discharge  06/21/21   Discharge Disposition  Home or Self Care   Discharge diagnosis  PN RLL thrombocytosis, sepsis   Does the patient have one of the following disease processes/diagnoses(primary or secondary)?  Sepsis   Does the patient have Home health ordered?  No   Is there a DME ordered?  No   Prep survey completed?  Yes          Dana Pittman RN

## 2021-06-21 NOTE — PROGRESS NOTES
[facility] Medicine Services  PROGRESS NOTE    Patient Name: Shaun Tariq  : 1955  MRN: [id number]    Date of Admission: 6/15/2021  Primary Care Physician: Cabrera Temple MD    Subjective   Subjective     CC:  F/U Cavitary Lung lesion     HPI:  Patient seen and examined.  Nursing notes reviewed.  No acute events overnight.  Patient states that he feels great, he has no complaints.  Plan for CT-guided biopsy today.  He remains on Zosyn.  Tolerating antibiotics without issue.  Is currently on room air.    ROS:  Gen- No fevers, chills  CV- No chest pain, palpitations  Resp- No cough, dyspnea  GI- No N/V/D, abd pain    Objective   Objective     Vital Signs:   Temp:  [98.1 °F (36.7 °C)-98.8 °F (37.1 °C)] 98.1 °F (36.7 °C)  Heart Rate:  [59-92] 72  Resp:  [18] 18  BP: (108-150)/(56-94) 118/83        Physical Exam:  Constitutional: No acute distress, awake, alert  HENT: NCAT, mucous membranes moist  Respiratory: Diminished in bases, respiratory effort normal room air  Cardiovascular: RRR, no murmurs, rubs, or gallops  Gastrointestinal: Positive bowel sounds, soft, nontender, nondistended  Musculoskeletal: No bilateral ankle edema  Psychiatric: Appropriate affect, cooperative  Neurologic: Oriented x 3, speech clear, no focal deficits  Skin: No rashes    Results Reviewed:  Results from last 7 days   Lab Units 21  0815 21  1100 21  0412 21  1023 06/15/21  1512   WBC 10*3/mm3 8.00 6.43 6.18  --  12.18*   HEMOGLOBIN g/dL 13.0 11.7* 10.7*  --  13.0   HEMATOCRIT % 41.7 37.1* 34.1*  --  39.7   PLATELETS 10*3/mm3 715* 622* 490*  --  490*   INR  1.10  --   --  1.11  --    PROCALCITONIN ng/mL  --   --   --   --  0.05     Results from last 7 days   Lab Units 21  0508 21  1426 21  0455 06/15/21  1512   SODIUM mmol/L 136 133* 131* 133*   POTASSIUM mmol/L 4.0  --  3.6 4.0   CHLORIDE mmol/L 101  --  98 97*   CO2 mmol/L 26.0  --  22.0 22.0   BUN mg/dL  9  --  14 15   CREATININE mg/dL 0.90  --  0.72* 0.88   GLUCOSE mg/dL 97  --  91 133*   CALCIUM mg/dL 8.9  --  8.7 9.9   ALT (SGPT) U/L  --   --   --  20   AST (SGOT) U/L  --   --   --  31   TROPONIN T ng/mL  --   --   --  <0.010   PROBNP pg/mL  --   --   --  165.1     Estimated Creatinine Clearance: 91.9 mL/min (by C-G formula based on SCr of 0.9 mg/dL).    Microbiology Results Abnormal     Procedure Component Value - Date/Time    Blood Culture - Blood, Arm, Left [[id number]] Collected: 06/15/21 1800    Lab Status: Final result Specimen: Blood from Arm, Left Updated: 06/20/21 1830     Blood Culture No growth at 5 days    Blood Culture - Blood, Arm, Right [[id number]] Collected: 06/15/21 1740    Lab Status: Final result Specimen: Blood from Arm, Right Updated: 06/20/21 1830     Blood Culture No growth at 5 days    Blastomyces Antigen - Urine, Urine, Clean Catch [[id number]] Collected: 06/16/21 0955    Lab Status: Final result Specimen: Urine, Clean Catch Updated: 06/19/21 0912     MVista(R) Blastomyces Ag None Detected ng/mL      Comment: Results reported as ng/mL in 0.2 - 14.7 ng/mL range  Results above the limit of detection but below 0.2 ng/mL  are reported as 'Positive, Below the Limit of  Quantification'  Results above 14.7 ng/mL are reported as 'Positive,  Above the Limit of Quantification'        Specimen Type URINE    Narrative:      Performed at:  01  [facility] [facility]  [address] [place] [address] [place]  [id number]  : Zachariah Peterson MD, Phone:  [phone]    Respiratory Culture - Sputum, Cough [[id number]] Collected: 06/16/21 1136    Lab Status: Final result Specimen: Sputum from Cough Updated: 06/18/21 1312     Respiratory Culture Scant growth (1+) Normal Respiratory Antonieta: NO S.aureus/MRSA or Pseudomonas aeruginosa     Gram Stain Many (4+) WBCs per low power field      Few (2+) Epithelial cells per low power field      Few (2+) Gram positive cocci in pairs and clusters      Rare (1+) Gram  negative bacilli    AFB Culture - Sputum, Cough [[id number]] Collected: 06/17/21 1247    Lab Status: Preliminary result Specimen: Sputum from Cough Updated: 06/18/21 1240     AFB Stain No acid fast bacilli seen on concentrated smear    AFB Culture - Sputum, Cough [[id number]] Collected: 06/17/21 1959    Lab Status: Preliminary result Specimen: Sputum from Cough Updated: 06/18/21 1240     AFB Stain No acid fast bacilli seen on concentrated smear    AFB Culture - Sputum, Cough [[id number]] Collected: 06/16/21 1132    Lab Status: Preliminary result Specimen: Sputum from Cough Updated: 06/17/21 1347     AFB Stain No acid fast bacilli seen on concentrated smear    MRSA Screen, PCR (Inpatient) - Swab, Nares [[id number]]  (Normal) Collected: 06/16/21 1810    Lab Status: Final result Specimen: Swab from Nares Updated: 06/16/21 2201     MRSA PCR Negative    Narrative:      MRSA Negative    S. Pneumo Ag Urine or CSF - Urine, Urine, Clean Catch [[id number]]  (Normal) Collected: 06/16/21 0955    Lab Status: Final result Specimen: Urine, Clean Catch Updated: 06/16/21 1458     Strep Pneumo Ag Negative    Legionella Antigen, Urine - Urine, Urine, Clean Catch [[id number]]  (Normal) Collected: 06/16/21 0955    Lab Status: Final result Specimen: Urine, Clean Catch Updated: 06/16/21 1458     LEGIONELLA ANTIGEN, URINE Negative    COVID-19 and FLU A/B PCR - Swab, Nasopharynx [[id number]]  (Normal) Collected: 06/15/21 1722    Lab Status: Final result Specimen: Swab from Nasopharynx Updated: 06/15/21 1801     COVID19 Not Detected     Influenza A PCR Not Detected     Influenza B PCR Not Detected    Narrative:      Fact sheet for providers: https://www.fda.gov/media/491157/download    Fact sheet for patients: https://www.fda.gov/media/232131/download    Test performed by PCR.          Imaging Results (Last 24 Hours)     Procedure Component Value Units Date/Time    CT Needle Biopsy Lung [[id number]] Resulted: 06/21/21 0948     Updated: 06/21/21 1044               I have reviewed the medications:  Scheduled Meds:guaiFENesin, 600 mg, Oral, Q12H  lactobacillus acidophilus, 1 capsule, Oral, Daily  piperacillin-tazobactam, 3.375 g, Intravenous, Q8H  saccharomyces boulardii, 250 mg, Oral, BID  sodium chloride, 10 mL, Intravenous, Q12H      Continuous Infusions:   PRN Meds:.•  acetaminophen  •  calcium carbonate  •  HYDROcod Polst-CPM Polst ER  •  LORazepam **OR** LORazepam **OR** LORazepam **OR** LORazepam **OR** LORazepam **OR** LORazepam  •  ondansetron **OR** ondansetron  •  sodium chloride  •  sodium chloride    Assessment/Plan   Assessment & Plan     Active Hospital Problems    Diagnosis  POA   • **Pneumonia of right lower lobe due to infectious organism [J18.9]  Yes   • Thrombocytosis (CMS/HCC) [D47.3]  Unknown   • Sepsis (CMS/HCC) [A41.9]  Unknown   • Hyponatremia [E87.1]  Unknown   • Night sweats [R61]  Unknown      Resolved Hospital Problems   No resolved problems to display.        Brief Hospital Course to date:  Shaun Tariq is a 65 y.o. male with no past medical history on no medications who presented with progressive SOA/cough x 4 days with night sweats for several months.     This patient's problems and plans were partially entered by my partner and updated as appropriate by me 06/21/21.  All problems are new to me today      Sepsis  PNA/cavitary lung lesion   Night sweats   Fever - improved   - CTA with 6 cm cavitary lesion in lateral right lung   - likely infectious with other symptoms but neoplasm can't be ruled out   - ID following, continue empiric Zosyn for now  - BCx NGTD; strep/legionella negative, MRSA negative; fungal workup pending, AFB x 3 negative  - TB quantiferon negative   - Patient initially in airborne isolation which has been removed with negative TB workup  - CT guided biopsy today -- to send for tissue culture (bacterial, anaerobic, fungal, AFB) and pathology   -Plan to follow-up with Dr. Musa in 1 to 2 weeks to review pending  send out studies     Mild hyponatremia - resolved   - Likely related to volume depletion -- improved with fluids      ETOH use  - 5-7 beers per night   - CIWA protocol, his CIWA has been 0 since admission.  Will DC     Thrombocytosis  -Platelet count continues to trend up.  Likely reactive but will have patient follow-up with his PCP on discharge.  May need hematology evaluation.     Tobacco use  -Continue to encourage cessation     DVT prophylaxis:  Mechanical DVT prophylaxis orders are present.       Disposition: I expect the patient to be discharged possibly later today on oral antibiotics if okay with infectious disease.    CODE STATUS:   Code Status and Medical Interventions:   Ordered at: 06/15/21 2014     Code Status:    CPR     Medical Interventions (Level of Support Prior to Arrest):    Full       Yvette Benson, DO  06/21/21

## 2021-06-21 NOTE — CASE MANAGEMENT/SOCIAL WORK
Continued Stay Note  [facility]     Patient Name: Shaun Tariq  MRN: [id number]  Today's Date: 6/21/2021    Admit Date: 6/15/2021    Discharge Plan     Row Name 06/21/21 3313       Plan    Plan  HOME    Patient/Family in Agreement with Plan  yes    Plan Comments  Met with pt at bedisde following lung biopsy.  Plan is home today on PO abx.  Of note, CM was notified per [employer] that pt is above the income limit for Medicaid.  CM arranged a new PCP per [facility] who will facilitate a payment plan for visits based on services.  No other immedaite DC needs identified/voiced.  CM will cont to follow.    Final Discharge Disposition Code  01 - home or self-care        Discharge Codes    No documentation.       Expected Discharge Date and Time     Expected Discharge Date Expected Discharge Time    Jun 21, 2021             Francesca Will RN

## 2021-06-21 NOTE — PROCEDURES
Radiology Procedure    Pre-procedure:  Right lower lobe cavitary pleural based lesion     Procedure Performed: CT-guided biopsy     IV Sedation and/or Anesthesia:  Yes, describe: 1 mg Versed, 100 ucg Fentanyl for comfort and pain control      Complications: None    Preliminary Findings: As above    Specimen Removed: 4 core biopsy x 2cm     Estimated Blood Loss:  0ml    Post-Procedure Diagnosis: RLL mass with cavitary center    Post-Procedure Plan: Await pathology full report    Standard Discharge Instructions Given:yes     Murray Mcdonald DO  06/21/21  12:29 EDT

## 2021-06-21 NOTE — PROGRESS NOTES
Infectious Disease Progress note  Shaun Tariq  1955  [id number]    Date of Consult: 6/16/2021  Admission Date: 6/15/2021    Requesting Provider: Dr Campbell  Evaluating Physician: Myles Musa MD    Chief Complaint: cough, dyspnea    Reason for Consultation: cavitary lung lesion    History of present illness:   Patient is a 65 y.o.  Yr old male with history of ongoing alcohol abuse (5-7 beers daily), and 30 pack year smoking history.  He presented to urgent care complaining of 4 days progress cough, sputum production and fever to 102. He complains of drenching night sweats for 3 months.  No weight loss admitted and started on meropenem. Febrile to 101.1 here. Not hypoxic on room air. COVID and flu negative. WBC 12. Normal lactate, procal.  Patient denies any known exposures to tuberculosis.  No history of international travel,  service, homelessness or incarceration.  He does have a pet dog but is not around any other animals including birds.  He has lived in [place] nearly his whole life.  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  He complains of poor appetite, nausea and emesis    6/17/21: Feels better today with less dyspnea.  Cough became productive with hypertonic saline and he has produced a least 2 sputum specimens per patient.  Currently not hypoxic off oxygen and afebrile overnight.  Less chest discomfort    6/18/21: Feels better again today.  Off oxygen.  Afebrile.  Less dyspnea, cough.  Tolerating current IV antibiotics.  Patient says he is currently uninsured, but working to get Medicaid coverage    6/19/21: Isolation cleared yesterday after AFB sputum smears negative the patient had already eaten so was unable to have lung biopsy done.  Now scheduled for Monday.  Patient continues to do well without worsening cough, dyspnea, fevers etc.  Tolerating IV antibiotics    6/20/21: He continues to do well.  In better spirits.  Resting comfortably on room  air.  Tolerating IV Zosyn.  Appetite improving.  Wife at bedside.  Patient reports no significant bird exposures.  [place] now lives in [facility].  Lifelong [place] resident.    6/21/21: IR guided biopsy completed this morning.  Patient is somewhat confused after procedure but says he tolerated well.  He is anxious for discharge home.  Tolerating Zosyn.  No new concerns.    Review of Systems  See above. Otherwise No n/v/d. No rash. No new ADR to Abx.       No Known Allergies    Antibiotics:  Anti-Infectives (From admission, onward)    Ordered     Dose/Rate Route Frequency Start Stop    06/16/21 0945  piperacillin-tazobactam (ZOSYN) 3.375 g in iso-osmotic dextrose 50 ml (premix)     Ordering Provider: Myles Musa MD    3.375 g  over 4 Hours Intravenous Every 8 Hours 06/16/21 1400 06/26/21 1359    06/15/21 2252  meropenem (MERREM) 1 g/100 mL 0.9% NS VTB (mbp)     Ordering Provider: Torrey Campbell MD    1 g  over 30 Minutes Intravenous Once 06/15/21 2345 06/16/21 0016    06/15/21 1932  cefTRIAXone (ROCEPHIN) 1 g/100 mL 0.9% NS (MBP)     Ordering Provider: Sharon Garcia APRN    1 g  over 30 Minutes Intravenous Once 06/15/21 1934 06/15/21 2205          Other Medications:  Current Facility-Administered Medications   Medication Dose Route Frequency Provider Last Rate Last Admin   • acetaminophen (TYLENOL) tablet 650 mg  650 mg Oral Q6H PRN Torrey Campbell MD   650 mg at 06/16/21 0554   • calcium carbonate (TUMS) chewable tablet 500 mg (200 mg elemental)  2 tablet Oral TID PRN Nickie Johnson PA-C   2 tablet at 06/17/21 2203   • guaiFENesin (MUCINEX) 12 hr tablet 600 mg  600 mg Oral Q12H Torrey Campbell MD   600 mg at 06/21/21 1107   • HYDROcod Polst-CPM Polst ER (TUSSIONEX PENNKINETIC) 10-8 MG/5ML ER suspension 2.5 mL  2.5 mL Oral Q12H PRN Rosibel Renee DO   2.5 mL at 06/19/21 2044   • lactobacillus acidophilus (RISAQUAD) capsule 1 capsule  1 capsule Oral Daily Myles Musa  "MD KLAUS   1 capsule at 06/21/21 1107   • LORazepam (ATIVAN) tablet 1 mg  1 mg Oral Q2H PRN Rosibel Renee DO   1 mg at 06/17/21 2202    Or   • LORazepam (ATIVAN) injection 1 mg  1 mg Intravenous Q2H PRN Rosibel Renee DO        Or   • LORazepam (ATIVAN) tablet 2 mg  2 mg Oral Q1H PRN Rosibel Renee DO        Or   • LORazepam (ATIVAN) injection 2 mg  2 mg Intravenous Q1H PRN Rosibel Renee DO        Or   • LORazepam (ATIVAN) injection 2 mg  2 mg Intravenous Q15 Min PRN Rosibel Renee DO        Or   • LORazepam (ATIVAN) injection 2 mg  2 mg Intramuscular Q15 Min PRN Rosibel Renee DO       • ondansetron (ZOFRAN) tablet 4 mg  4 mg Oral Q6H PRN Torrey Campbell MD        Or   • ondansetron (ZOFRAN) injection 4 mg  4 mg Intravenous Q6H PRN Torrey Campbell MD   4 mg at 06/16/21 1304   • piperacillin-tazobactam (ZOSYN) 3.375 g in iso-osmotic dextrose 50 ml (premix)  3.375 g Intravenous Q8H Myles Musa MD   3.375 g at 06/21/21 0716   • saccharomyces boulardii (FLORASTOR) capsule 250 mg  250 mg Oral BID Torrey Campbell MD   250 mg at 06/21/21 1107   • sodium chloride 0.9 % flush 10 mL  10 mL Intravenous PRN Torrey Campbell MD       • sodium chloride 0.9 % flush 10 mL  10 mL Intravenous Q12H Torrey Campbell MD   10 mL at 06/20/21 2123   • sodium chloride 0.9 % flush 10 mL  10 mL Intravenous PRN Torrey Campbell MD           Physical Exam:   Vital Signs   /86 (BP Location: Right arm, Patient Position: Lying)   Pulse 85   Temp 98.2 °F (36.8 °C) (Oral)   Resp 18   Ht 172.7 cm (68\")   Wt 90.7 kg (200 lb)   SpO2 94%   BMI 30.41 kg/m²     GENERAL: Awake and alert, reasonably comfortable appearing  HEENT: Normocephalic, atraumatic.  EOMI. No conjunctival injection. No icterus. Oropharynx clear without evidence of thrush or exudate.  Fair dentition  NECK: Supple without nuchal rigidity. No mass.  HEART: RRR; No murmur.  LUNGS: diminished at right base. Otherwise clear. No cough noted today "   ABDOMEN: Soft, nontender, nondistended. Obese. No rebound or guarding.  EXT:  No cyanosis, clubbing or edema.  : Without Yanes catheter.  MSK: FROM without joint effusions noted arms/legs.    SKIN: Warm and dry without cutaneous eruptions on Inspection/palpation.    NEURO: Oriented to PPT. No focal deficits on motor/sensory exam at arms/legs.  PSYCHIATRIC: Normal insight and judgement. Cooperative with PE  PIV    Laboratory Data    Results from last 7 days   Lab Units 06/21/21  0815 06/20/21  1100 06/18/21  0412   WBC 10*3/mm3 8.00 6.43 6.18   HEMOGLOBIN g/dL 13.0 11.7* 10.7*   HEMATOCRIT % 41.7 37.1* 34.1*   PLATELETS 10*3/mm3 715* 622* 490*     Results from last 7 days   Lab Units 06/17/21  0508   SODIUM mmol/L 136   POTASSIUM mmol/L 4.0   CHLORIDE mmol/L 101   CO2 mmol/L 26.0   BUN mg/dL 9   CREATININE mg/dL 0.90   GLUCOSE mg/dL 97   CALCIUM mg/dL 8.9     Estimated Creatinine Clearance: 91.9 mL/min (by C-G formula based on SCr of 0.9 mg/dL).  Results from last 7 days   Lab Units 06/15/21  1512   ALK PHOS U/L 81   BILIRUBIN mg/dL 0.3   ALT (SGPT) U/L 20   AST (SGOT) U/L 31               Microbiology:  COVID and flu negative  Blood cx pending, so far negative  Urine strep and legionella antigens negative  Respiratory culture with normal trudi  Urine blastomycosis antigen negative  MRSA nares screening negative  AFB smear x3 negative. Cultures pending  Fungal culture pending  QuantiFERON negative  Single Histo antibody 1:4 other negative  Aspergillus serologies negative    Radiology:  XR Chest 2 View    Result Date: 6/15/2021  Findings concerning for cavitating pneumonia in the right midlung.  This report was finalized on 6/15/2021 2:04 PM by Linden Real.      CT Angiogram Chest    Result Date: 6/15/2021  Impression: 1. No evidence of pulmonary embolus on this study. 2. 6 cm cavitary lesion in the lateral aspect of the right lower lobe. The differential diagnosis would be a cavitating pneumonia/abscess  versus a cavitating neoplasm. Signer Name: Thiago Quinn MD  Signed: 6/15/2021 7:17 PM  Workstation Name: [id number]  [facility]     I personally reviewed the above CT images. Large thick walled RLL cavitary lesion. No other focal infiltrate      Impression:   1. Right lower lobe cavitary lesion: 6 cm on CT. Thick walled.  Differential includes bacterial pneumonia, fungal pneumonia, tuberculosis, malignancy.  Could be malignancy with secondary infection. less likely vasculitis with his age.   No specific risk factors for tuberculosis identified but with his history of several months of night sweats and his imaging findings we certainly need to rule it out.  Clinically are negative and 3 AFB sputum's ears negative so cleared from isolation.  AFB cultures pending.  Fungal work-up pending.  Improving over 48 hours with antibacterial coverage alone makes bacterial etiology most likely.  2. 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  Not surprising in a lifelong [place] resident and most likely represents remote exposure since he is improving without antifungal coverage. Antigens pending.   3. Fever, sepsis, leukocytosis   4. Night sweats  5. Former smoker  6. Ongoing alcohol abuse    PLAN:   - Airborne isolation discontinued    - Fungal work-up including aspergillosis, histoplasmosis and blastomycosis antigens and antibodies pending  - Follow-up pending blood cultures  - Follow CBC, CMP    -  CT-guided biopsy completed today. Follow up pending tissue cultures (bacterial, anaerobic, fungal, AFB) and specimen for pathology to screen for malignancy      - stop zosyn  - PO Augmentin 875-125 mg BID for the next several weeks.  Likely to need 4 to 6 weeks of antibiotics for lung abscess  - probiotic    Okay to discharge today from my perspective.  Follow-up in clinic with me next week, to review pending studies. [facility] staff will contact him with exact appointment time.  Notified Dr Benson.         Myles Musa MD  6/21/2021

## 2021-06-21 NOTE — NURSING NOTE
CT guided right lung biopsy performed by Dr Mcdonald.  Tony applied to biopsy site  CXR ordered one hour post procedure.  Report called to patient's nurse on 4G.

## 2021-06-22 ENCOUNTER — TRANSITIONAL CARE MANAGEMENT TELEPHONE ENCOUNTER (OUTPATIENT)
Dept: CALL CENTER | Facility: HOSPITAL | Age: 66
End: 2021-06-22

## 2021-06-22 NOTE — DISCHARGE SUMMARY
[facility] Medicine Services  DISCHARGE SUMMARY    Patient Name: Shaun Tariq  : 1955  MRN: [id number]    Date of Admission: 6/15/2021  4:58 PM  Date of Discharge:  2021  Primary Care Physician: Cabrera Temple MD    Consults     Date and Time Order Name Status Description    6/15/2021 10:52 PM Inpatient Infectious Diseases Consult Completed           Hospital Course     Presenting Problem:   Pneumonia of right lower lobe due to infectious organism [J18.9]    Active Hospital Problems    Diagnosis  POA   • **Pneumonia of right lower lobe due to infectious organism [J18.9]  Yes   • Thrombocytosis (CMS/HCC) [D47.3]  Unknown      Resolved Hospital Problems    Diagnosis Date Resolved POA   • Sepsis (CMS/HCC) [A41.9] 2021 Unknown   • Hyponatremia [E87.1] 2021 Unknown   • Night sweats [R61] 2021 Unknown          Hospital Course:  Shaun Tariq is a 65 y.o. male with no past medical history on no medications who presented with progressive SOA/cough x 4 days with night sweats for several months.      Sepsis  PNA/cavitary lung lesion   Night sweats   Fever - improved   - CTA with 6 cm cavitary lesion in lateral right lung   - likely infectious with other symptoms but neoplasm can't be ruled out   - ID following,  discussed with Dr. Musa.  Patient will be transitioned from Zosyn to Augmentin and will follow up with him next week on cultures/pathology  - BCx NGTD; strep/legionella negative, MRSA negative; fungal workup pending, AFB x 3 negative  - TB quantiferon negative   - Patient initially in airborne isolation which has been removed with negative TB workup  -Status post CT guided biopsy today -- to send for tissue culture (bacterial, anaerobic, fungal, AFB) and pathology       Mild hyponatremia - resolved   - Likely related to volume depletion -- improved with fluids      ETOH use  - 5-7 beers per night   - CIWA protocol, his CIWA has been 0 since  admission.      Thrombocytosis  -Platelet count continues to trend up.  Likely reactive but will have patient follow-up with his PCP on discharge.  May need hematology evaluation.     Tobacco use  -Continue to encourage cessation     Discharge Follow Up Recommendations for outpatient labs/diagnostics:  -PCP in 1 week with repeat CBC to monitor platelet count.  Recommend hematology evaluation if continues to trend up.  -Dr. Musa next week for follow-up on pathology and culture results from CT-guided biopsy  Day of Discharge     HPI:   Patient seen and examined.  Nursing notes reviewed.  No acute events overnight.  Patient states that he feels great, he has no complaints.  Plan for CT-guided biopsy today.  He remains on Zosyn.  Tolerating antibiotics without issue.  Is currently on room air.       Review of Systems  Gen- No fevers, chills  CV- No chest pain, palpitations  Resp- No cough, dyspnea  GI- No N/V/D, abd pain    Vital Signs:         Physical Exam:  Constitutional: No acute distress, awake, alert  HENT: NCAT, mucous membranes moist  Respiratory: Diminished in bases, respiratory effort normal room air  Cardiovascular: RRR, no murmurs, rubs, or gallops  Gastrointestinal: Positive bowel sounds, soft, nontender, nondistended  Musculoskeletal: No bilateral ankle edema  Psychiatric: Appropriate affect, cooperative  Neurologic: Oriented x 3, speech clear, no focal deficits  Skin: No rashes    Pertinent  and/or Most Recent Results     LAB RESULTS:      Lab 06/21/21  0815 06/20/21  1100 06/18/21  0412 06/17/21  0508 06/16/21  1023 06/16/21  0455 06/15/21  1512   WBC 8.00 6.43 6.18 9.12  --  10.59 12.18*   HEMOGLOBIN 13.0 11.7* 10.7* 11.4*  --  10.9* 13.0   HEMATOCRIT 41.7 37.1* 34.1* 35.5*  --  35.1* 39.7   PLATELETS 715* 622* 490* 484*  --  475* 490*   NEUTROS ABS  --   --   --   --   --   --  9.63*   IMMATURE GRANS (ABS)  --   --   --   --   --   --  0.05   LYMPHS ABS  --   --   --   --   --   --  1.10   MONOS  ABS  --   --   --   --   --   --  1.30*   EOS ABS  --   --   --   --   --   --  0.05   .5* 99.2* 97.7* 99.7*  --  98.9* 97.5*   PROCALCITONIN  --   --   --   --   --   --  0.05   LACTATE  --   --   --   --   --   --  1.2   PROTIME 13.9  --   --   --  14.0  --   --    APTT  --   --   --   --  33.3  --   --          Lab 06/17/21  0508 06/16/21  1426 06/16/21  0455 06/15/21  1512   SODIUM 136 133* 131* 133*   POTASSIUM 4.0  --  3.6 4.0   CHLORIDE 101  --  98 97*   CO2 26.0  --  22.0 22.0   ANION GAP 9.0  --  11.0 14.0   BUN 9  --  14 15   CREATININE 0.90  --  0.72* 0.88   GLUCOSE 97  --  91 133*   CALCIUM 8.9  --  8.7 9.9         Lab 06/15/21  1512   TOTAL PROTEIN 8.1   ALBUMIN 3.70   GLOBULIN 4.4   ALT (SGPT) 20   AST (SGOT) 31   BILIRUBIN 0.3   ALK PHOS 81         Lab 06/21/21  0815 06/16/21  1023 06/15/21  1512   PROBNP  --   --  165.1   TROPONIN T  --   --  <0.010   PROTIME 13.9 14.0  --    INR 1.10 1.11  --                  Brief Urine Lab Results     None        Microbiology Results (last 10 days)     Procedure Component Value - Date/Time    Tissue / Bone Culture - Tissue, Lung, R [[id number]] Collected: 06/21/21 1028    Lab Status: Preliminary result Specimen: Tissue from Lung, R Updated: 06/22/21 0724     Tissue Culture No growth     Gram Stain Few (2+) WBCs per low power field      No organisms seen    AFB Culture - Tissue, Lung, R [[id number]] Collected: 06/21/21 1028    Lab Status: Preliminary result Specimen: Tissue from Lung, R Updated: 06/22/21 1249     AFB Stain No acid fast bacilli seen on concentrated smear    AFB Culture - Sputum, Cough [[id number]] Collected: 06/17/21 1959    Lab Status: Preliminary result Specimen: Sputum from Cough Updated: 06/18/21 1240     AFB Stain No acid fast bacilli seen on concentrated smear    AFB Culture - Sputum, Cough [[id number]] Collected: 06/17/21 1247    Lab Status: Preliminary result Specimen: Sputum from Cough Updated: 06/22/21 1300     AFB Culture No AFB  isolated at less than 1 week     AFB Stain No acid fast bacilli seen on concentrated smear    Fungus Culture, Blood - Blood, Hand, Left [[id number]]  (Normal) Collected: 06/16/21 1840    Lab Status: Preliminary result Specimen: Blood from Hand, Left Updated: 06/21/21 1945     Fungus Culture No fungus isolated at less than 1 week    MRSA Screen, PCR (Inpatient) - Swab, Nares [[id number]]  (Normal) Collected: 06/16/21 1810    Lab Status: Final result Specimen: Swab from Nares Updated: 06/16/21 2201     MRSA PCR Negative    Narrative:      MRSA Negative    AFB Culture - Sputum, Cough [[id number]] Collected: 06/16/21 1132    Lab Status: Preliminary result Specimen: Sputum from Cough Updated: 06/21/21 1200     AFB Culture No AFB isolated at less than 1 week     AFB Stain No acid fast bacilli seen on concentrated smear    Respiratory Culture - Sputum, Cough [[id number]] Collected: 06/16/21 1132    Lab Status: Final result Specimen: Sputum from Cough Updated: 06/18/21 1312     Respiratory Culture Scant growth (1+) Normal Respiratory Antonieta: NO S.aureus/MRSA or Pseudomonas aeruginosa     Gram Stain Many (4+) WBCs per low power field      Few (2+) Epithelial cells per low power field      Few (2+) Gram positive cocci in pairs and clusters      Rare (1+) Gram negative bacilli    Fungus Culture - Sputum, Cough [[id number]] Collected: 06/16/21 1132    Lab Status: Preliminary result Specimen: Sputum from Cough Updated: 06/21/21 1200     Fungus Culture No fungus isolated at less than 1 week    S. Pneumo Ag Urine or CSF - Urine, Urine, Clean Catch [[id number]]  (Normal) Collected: 06/16/21 0955    Lab Status: Final result Specimen: Urine, Clean Catch Updated: 06/16/21 1458     Strep Pneumo Ag Negative    Legionella Antigen, Urine - Urine, Urine, Clean Catch [[id number]]  (Normal) Collected: 06/16/21 0955    Lab Status: Final result Specimen: Urine, Clean Catch Updated: 06/16/21 1458     LEGIONELLA ANTIGEN, URINE Negative    Blastomyces  Antigen - Urine, Urine, Clean Catch [[id number]] Collected: 06/16/21 0955    Lab Status: Final result Specimen: Urine, Clean Catch Updated: 06/19/21 0912     MVista(R) Blastomyces Ag None Detected ng/mL      Comment: Results reported as ng/mL in 0.2 - 14.7 ng/mL range  Results above the limit of detection but below 0.2 ng/mL  are reported as 'Positive, Below the Limit of  Quantification'  Results above 14.7 ng/mL are reported as 'Positive,  Above the Limit of Quantification'        Specimen Type URINE    Narrative:      Performed at:  [facility]  [address] [place] [address] [place]  [id number]  : Zachariah Peterson MD, Phone:  [phone]    Blood Culture - Blood, Arm, Left [[id number]] Collected: 06/15/21 1800    Lab Status: Final result Specimen: Blood from Arm, Left Updated: 06/20/21 1830     Blood Culture No growth at 5 days    Blood Culture - Blood, Arm, Right [[id number]] Collected: 06/15/21 1740    Lab Status: Final result Specimen: Blood from Arm, Right Updated: 06/20/21 1830     Blood Culture No growth at 5 days    COVID-19 and FLU A/B PCR - Swab, Nasopharynx [[id number]]  (Normal) Collected: 06/15/21 1722    Lab Status: Final result Specimen: Swab from Nasopharynx Updated: 06/15/21 1801     COVID19 Not Detected     Influenza A PCR Not Detected     Influenza B PCR Not Detected    Narrative:      Fact sheet for providers: https://www.fda.gov/media/951274/download    Fact sheet for patients: https://www.fda.gov/media/740527/download    Test performed by PCR.    COVID-19 PCR, [facility], NP SWAB IN LEXAR VIRAL TRANSPORT MEDIA 24-30 HR TAT - Swab, Nasopharynx [[id number]] Collected: 06/15/21 1341    Lab Status: Final result Specimen: Swab from Nasopharynx Updated: 06/16/21 1254     SARS-CoV-2 WANDY Not Detected          XR Chest 2 View    Result Date: 6/15/2021  EXAMINATION: XR CHEST 2 VW-  INDICATION: cough fever SOA; R50.9-Fever, unspecified; R05-Cough; R06.02-Shortness of breath  COMPARISON:  NONE  FINDINGS: Consolidation is noted peripherally in the right midlung with some questionable central cavitation, overall concerning for cavitating infection/pneumonia. No effusion or pneumothorax.      Findings concerning for cavitating pneumonia in the right midlung.  This report was finalized on 6/15/2021 2:04 PM by Linden Real.      XR Chest 1 View    Result Date: 6/21/2021  EXAMINATION: XR CHEST 1 VW- 06/21/2021  INDICATION: one hour post lung biopsy; J18.9-Pneumonia, unspecified organism; J98.4-Other disorders of lung  COMPARISON: 06/15/2021  FINDINGS: Patient's right lower lung mass is again noted, with minimal if any remaining central fluid collection. No pneumothorax, effusion or new pulmonary parenchymal disease is seen. Heart and vasculature appear normal in size.      Right lower lung nodule or infiltrate, with decreasing fluid and air compared to prior exam. No pneumothorax or other new chest disease is seen.   D:  06/21/2021 E:  06/21/2021  This report was finalized on 6/21/2021 11:00 PM by Dr. Torrey Calero MD.      CT Needle Biopsy Lung    Result Date: 6/21/2021  EXAMINATION: CT NEEDLE BIOPSY LUNG- 06/21/2021  INDICATION: Cavitary lung lesion. Need tissue for pathology and cultures; J18.9-Pneumonia, unspecified organism; J98.4-Other disorders of lung  TECHNIQUE: Limited helical CT scan of the chest without intravenous contrast  The radiation dose reduction device was turned on for each scan per the ALARA (As Low as Reasonably Achievable) protocol.  COMPARISON: CT dated 06/15/2021  FINDINGS: Patient referred for lung biopsy of right cavitary lung lesion seen on 06/15/2021 CT comparison.  Informed consent obtained and signed. Patient placed in supine position for examination and procedure. 1% lidocaine used for local anesthetic of the right lateral chest wall soft tissues with 15 cm 25-gauge spinal needle used for deeper anesthetic to the level of the right intercostal region and pleural margin  with subsequent 19/20 gauge coaxial system advanced into the pulmonary mass lesion and subsequent core biopsies x4 performed at 2 cm length throughout without complication. Catheter removed.  Patient tolerated well without complication.  D:  06/21/2021 E:  06/21/2021        Satisfactory CT-guided right lower lobe biopsy   This report was finalized on 6/21/2021 6:51 PM by Dr. Mruray Mcdonald.      CT Angiogram Chest    Result Date: 6/15/2021  CTA Chest INDICATION: Fever and shortness of breath. COVID positive. TECHNIQUE: CT angiogram of the chest with 100 cc of IV contrast. 3-D reconstructions were obtained and reviewed.   Radiation dose reduction techniques included automated exposure control or exposure modulation based on body size. Count of known CT and cardiac nuc med studies performed in previous 12 months: 0. COMPARISON: None available. FINDINGS: Contrast timing is appropriate for adequate sensitivity. The main pulmonary trunk is of normal caliber. No filling defects in the central, lobar, or segmental pulmonary arteries. No interventricular septal bowing or hepatic reflux of contrast to suggest right heart strain. Heart size is normal. No pericardial effusion. The aorta is non aneurysmal without evidence of dissection. Central airways are patent. No endobronchial lesions. There is a 6 cm cavitary lesion in the lateral aspect of the right lower lobe. No threshold enlarged mediastinal, hilar or axillary lymph nodes. No acute findings in visualized upper abdomen. Regional osseous structures show no acute or aggressive bone lesions.     Impression: 1. No evidence of pulmonary embolus on this study. 2. 6 cm cavitary lesion in the lateral aspect of the right lower lobe. The differential diagnosis would be a cavitating pneumonia/abscess versus a cavitating neoplasm. Signer Name: Thiago Quinn MD  Signed: 6/15/2021 7:17 PM  Workstation Name: [id number]  [facility]                  Plan for  Follow-up of Pending Labs/Results: ID   Pending Labs     Order Current Status    Tissue Pathology Exam Collected (06/21/21 1028)    Tissue Pathology Exam Collected (06/21/21 1028)    Anaerobic Culture - Tissue, Lung, R In process    Fungitell B-D Glucan In process    Fungus Culture - Tissue, Lung, R In process    Tissue Pathology Exam In process    AFB Culture - Sputum, Cough Preliminary result    AFB Culture - Sputum, Cough Preliminary result    AFB Culture - Sputum, Cough Preliminary result    AFB Culture - Tissue, Lung, R Preliminary result    Fungus Culture - Sputum, Cough Preliminary result    Fungus Culture, Blood - Blood, Hand, Left Preliminary result    Tissue / Bone Culture - Tissue, Lung, R Preliminary result        Discharge Details        Discharge Medications      New Medications      Instructions Start Date   amoxicillin-clavulanate 875-125 MG per tablet  Commonly known as: Augmentin   1 tablet, Oral, 2 Times Daily      saccharomyces boulardii 250 MG capsule  Commonly known as: FLORASTOR   250 mg, Oral, 2 Times Daily             No Known Allergies      Discharge Disposition:  Home or Self Care    Diet:  Hospital:No active diet order      Activity:      Restrictions or Other Recommendations:       CODE STATUS:    Code Status and Medical Interventions:   Ordered at: 06/15/21 2014     Code Status:    CPR     Medical Interventions (Level of Support Prior to Arrest):    Full       Future Appointments   Date Time Provider Department Center   6/23/2021 12:30 PM Carlos Dewitt DO [facility]       Additional Instructions for the Follow-ups that You Need to Schedule     Discharge Follow-up with PCP   As directed       Currently Documented PCP:    Cabrera Temple MD    PCP Phone Number:    [phone]     Follow Up Details: 1 week. Monitor Platelet count.         Discharge Follow-up with Specified Provider: Dr Musa; 1 Week   As directed      To: Dr Musa    Follow Up: 1 Week     Follow Up Details: Ensure patient has appt prior to DC                     Yvette Benson DO  06/22/21      Time Spent on Discharge:  I spent  40  minutes on this discharge activity which included: face-to-face encounter with the patient, reviewing the data in the system, coordination of the care with the nursing staff as well as consultants, documentation, and entering orders.

## 2021-06-22 NOTE — OUTREACH NOTE
Call Center TCM Note      Responses   [facility] patient discharged from?  [place]   Does the patient have one of the following disease processes/diagnoses(primary or secondary)?  Sepsis   TCM attempt successful?  No   Unsuccessful attempts  Attempt 1          Cynthia Roblero LPN    6/22/2021, 14:36 EDT

## 2021-06-22 NOTE — OUTREACH NOTE
Call Center TCM Note      Responses   [facility] patient discharged from?  [place]   Does the patient have one of the following disease processes/diagnoses(primary or secondary)?  Sepsis   TCM attempt successful?  No   Unsuccessful attempts  Attempt 2          Cynthia Roblero LPN    6/22/2021, 15:20 EDT

## 2021-06-23 ENCOUNTER — TRANSITIONAL CARE MANAGEMENT TELEPHONE ENCOUNTER (OUTPATIENT)
Dept: CALL CENTER | Facility: HOSPITAL | Age: 66
End: 2021-06-23

## 2021-06-23 LAB
1,3 BETA GLUCAN SER-MCNC: 63 PG/ML
CYTO UR: NORMAL
LAB AP CASE REPORT: NORMAL
LAB AP CLINICAL INFORMATION: NORMAL
Lab: NORMAL
PATH REPORT.FINAL DX SPEC: NORMAL
PATH REPORT.GROSS SPEC: NORMAL
QT INTERVAL: 312 MS
QTC INTERVAL: 422 MS

## 2021-06-23 NOTE — OUTREACH NOTE
Call Center TCM Note      Responses   [facility] patient discharged from?  [place]   Does the patient have one of the following disease processes/diagnoses(primary or secondary)?  Sepsis   TCM attempt successful?  No   Unsuccessful attempts  Attempt 3          Meg Gomez RN    6/23/2021, 10:41 EDT

## 2021-06-24 LAB
BACTERIA SPEC AEROBE CULT: NORMAL
GRAM STN SPEC: NORMAL
GRAM STN SPEC: NORMAL

## 2021-06-26 LAB — BACTERIA SPEC ANAEROBE CULT: NORMAL

## 2021-06-30 ENCOUNTER — READMISSION MANAGEMENT (OUTPATIENT)
Dept: CALL CENTER | Facility: HOSPITAL | Age: 66
End: 2021-06-30

## 2021-06-30 NOTE — OUTREACH NOTE
Sepsis Week 2 Survey      Responses   [facility] patient discharged from?  [place]   Does the patient have one of the following disease processes/diagnoses(primary or secondary)?  Sepsis   Week 2 attempt successful?  No   Unsuccessful attempts  Attempt 1   Discharge diagnosis  PN RLL thrombocytosis, sepsis          Patt Mobley, RN

## 2021-07-02 ENCOUNTER — READMISSION MANAGEMENT (OUTPATIENT)
Dept: CALL CENTER | Facility: HOSPITAL | Age: 66
End: 2021-07-02

## 2021-07-02 NOTE — OUTREACH NOTE
Sepsis Week 2 Survey      Responses   [facility] patient discharged from?  [place]   Does the patient have one of the following disease processes/diagnoses(primary or secondary)?  Sepsis   Week 2 attempt successful?  No   Unsuccessful attempts  Attempt 2          Nathaly Solis RN

## 2021-07-13 ENCOUNTER — READMISSION MANAGEMENT (OUTPATIENT)
Dept: CALL CENTER | Facility: HOSPITAL | Age: 66
End: 2021-07-13

## 2021-07-13 NOTE — OUTREACH NOTE
Sepsis Week 3 Survey      Responses   [facility] [place] patient discharged from?  [place]   Does the patient have one of the following disease processes/diagnoses(primary or secondary)?  Sepsis   Week 3 attempt successful?  Yes   Call start time  1504   Call end time  1508   Discharge diagnosis  PN RLL thrombocytosis, sepsis   Meds reviewed with patient/caregiver?  Yes   Is the patient having any side effects they believe may be caused by any medication additions or changes?  No   Does the patient have all medications related to this admission filled (includes all antibiotics, inhalers, nebulizers,steroids,etc.)  Yes   Is the patient taking all medications as directed (includes completed medication regime)?  Yes   Does the patient have a primary care provider?   Yes   What is preventing the patient from scheduling follow up appointments within 7 days of discharge?  Haven't had time, Waiting on return call   Nursing Interventions  Urgent appointment needed   Has the patient kept scheduled appointments due by today?  N/A   Has home health visited the patient within 72 hours of discharge?  N/A   Psychosocial issues?  No   Did the patient receive a copy of their discharge instructions?  Yes   Nursing interventions  Reviewed instructions with patient   What is the patient's perception of their health status since discharge?  Improving   Nursing interventions  Nurse provided patient education   Is the patient/caregiver able to teach back Sepsis?  S - Shivering,fever or very cold, E - Extreme pain or generalized discomfort (worst ever,especially abdomen), P - Pale or discolored skin, S - Sleepy, difficult to arouse,confused, I -   I feel like I might die-a feeling of hopelessness, S - Short of breath   Nursing interventions  Nurse provided reassurance to patient   Is patient/caregiver able to teach back steps to recovery at home?  Set small, achievable goals for return to baseline health, Rest and regain strength, Eat a  balanced diet   Is the patient/caregiver able to teach back signs and symptoms of worsening condition:  Fever, Rapid heart rate (>90), Altered mental status(confusion/coma), Hyperthermia, Shortness of breath/rapid respiratory rate, Edema   If the patient is a current smoker, are they able to teach back resources for cessation?  Not a smoker   Is the patient/caregiver able to teach back the hierarchy of who to call/visit for symptoms/problems? PCP, Specialist, Home health nurse, Urgent Care, ED, 911  Yes   Week 3 call completed?  Yes          Hillary Swartz RN

## 2021-07-28 LAB
FUNGUS WND CULT: NORMAL
FUNGUS WND CULT: NORMAL

## 2021-07-29 LAB
MYCOBACTERIUM SPEC CULT: NORMAL
MYCOBACTERIUM SPEC CULT: NORMAL
NIGHT BLUE STAIN TISS: NORMAL
NIGHT BLUE STAIN TISS: NORMAL

## 2021-08-02 LAB
FUNGUS WND CULT: NORMAL
MYCOBACTERIUM SPEC CULT: NORMAL
NIGHT BLUE STAIN TISS: NORMAL

## 2021-08-04 LAB
MYCOBACTERIUM SPEC CULT: NORMAL
NIGHT BLUE STAIN TISS: NORMAL

## 2023-05-30 ENCOUNTER — TRANSCRIBE ORDERS (OUTPATIENT)
Dept: MAMMOGRAPHY | Facility: HOSPITAL | Age: 68
End: 2023-05-30

## 2023-05-30 ENCOUNTER — TRANSCRIBE ORDERS (OUTPATIENT)
Dept: ADMINISTRATIVE | Facility: HOSPITAL | Age: 68
End: 2023-05-30

## 2023-05-30 DIAGNOSIS — Z87.891 PERSONAL HISTORY OF TOBACCO USE, PRESENTING HAZARDS TO HEALTH: Primary | ICD-10-CM

## 2023-07-27 ENCOUNTER — HOSPITAL ENCOUNTER (OUTPATIENT)
Dept: CT IMAGING | Facility: HOSPITAL | Age: 68
Discharge: HOME | End: 2023-07-27
Admitting: FAMILY MEDICINE
Payer: MEDICARE

## 2023-07-27 DIAGNOSIS — Z87.891 PERSONAL HISTORY OF TOBACCO USE, PRESENTING HAZARDS TO HEALTH: ICD-10-CM

## 2023-07-27 PROCEDURE — 71271 CT THORAX LUNG CANCER SCR C-: CPT

## 2023-08-22 ENCOUNTER — OFFICE VISIT (OUTPATIENT)
Dept: PULMONOLOGY | Facility: CLINIC | Age: 68
End: 2023-08-22
Payer: MEDICARE

## 2023-08-22 ENCOUNTER — PATIENT OUTREACH (OUTPATIENT)
Dept: ONCOLOGY | Facility: CLINIC | Age: 68
End: 2023-08-22
Payer: MEDICARE

## 2023-08-22 VITALS
TEMPERATURE: 98.1 F | BODY MASS INDEX: 30.46 KG/M2 | SYSTOLIC BLOOD PRESSURE: 136 MMHG | WEIGHT: 201 LBS | HEART RATE: 88 BPM | DIASTOLIC BLOOD PRESSURE: 72 MMHG | OXYGEN SATURATION: 97 % | HEIGHT: 68 IN

## 2023-08-22 DIAGNOSIS — R91.1 NODULE OF LOWER LOBE OF LEFT LUNG: ICD-10-CM

## 2023-08-22 DIAGNOSIS — Z87.891 HISTORY OF TOBACCO USE, PRESENTING HAZARDS TO HEALTH: ICD-10-CM

## 2023-08-22 DIAGNOSIS — R06.02 SOB (SHORTNESS OF BREATH) ON EXERTION: Primary | ICD-10-CM

## 2023-08-22 PROCEDURE — 94375 RESPIRATORY FLOW VOLUME LOOP: CPT | Performed by: INTERNAL MEDICINE

## 2023-08-22 PROCEDURE — 99204 OFFICE O/P NEW MOD 45 MIN: CPT | Performed by: INTERNAL MEDICINE

## 2023-08-22 PROCEDURE — 94726 PLETHYSMOGRAPHY LUNG VOLUMES: CPT | Performed by: INTERNAL MEDICINE

## 2023-08-22 PROCEDURE — 94729 DIFFUSING CAPACITY: CPT | Performed by: INTERNAL MEDICINE

## 2023-08-22 NOTE — PROGRESS NOTES
Initial Pulmonary Consult Note    Subjective   Reason for consultation:     Shaun Tariq is a 67 y.o. male is being seen for consultation today at the request of Cabrera Temple, *    History of Present Illness    67 y.o. former smoker 1/2 PPD for 40 years who quit 5 years ago is here for an abnormal chest CT with new  left lower lobe nodule.  Patient worked as a /.  Nodule was detected on screening CT scan.  There is a prior history of a right lower lobe lung abscess which appears to have resolved.    The following portions of the patient's history were reviewed and updated as appropriate: allergies, current medications, past family history, past medical history, past social history, past surgical history, and problem list.    Active Ambulatory Problems     Diagnosis Date Noted    Pneumonia of right lower lobe due to infectious organism 06/15/2021    Thrombocytosis 2021    Nodule of lower lobe of left lung -9 mm on CT scan 2023. 2023    History of tobacco use, presenting hazards to health 2023    SOB (shortness of breath) on exertion 2023     Resolved Ambulatory Problems     Diagnosis Date Noted    Night sweats 06/15/2021    Sepsis 2021    Hyponatremia 2021     Past Medical History:   Diagnosis Date    Agitation     Alcohol abuse     Anxiety     Arthritis     Environmental allergies     Former smoker     Hearing loss     Hypertension     Tremors of nervous system        Past Surgical History:   Procedure Laterality Date    VASECTOMY Bilateral        Family History   Problem Relation Age of Onset    Diabetes Father        Social History     Socioeconomic History    Marital status:    Tobacco Use    Smoking status: Former     Packs/day: 0.50     Years: 40.00     Pack years: 20.00     Types: Cigarettes     Quit date: 2019     Years since quittin.6    Smokeless tobacco: Never   Vaping Use    Vaping Use: Never used   Substance and Sexual  "Activity    Alcohol use: Yes    Drug use: Defer    Sexual activity: Defer       Allergies   Allergen Reactions    Dust Mite Extract Itching    Molds & Smuts Itching    Pollen Extract Itching       No current outpatient medications on file.     No current facility-administered medications for this visit.       Review of Systems  All other systems were reviewed and are negative.  Exceptions are included in the HPI or as otherwise noted above.     Objective   Blood pressure 136/72, pulse 88, temperature 98.1 øF (36.7 øC), height 172.7 cm (68\"), weight 91.2 kg (201 lb), SpO2 97 %.  Physical Exam    Physical Exam:     Constitutional:    Alert, cooperative, in no acute distress   Head:    Normocephalic, without obvious abnormality, atraumatic   Eyes:            Lids and lashes normal, conjunctivae and sclerae normal, no   icterus, no pallor, corneas clear, PER   ENMT:   Ears appear intact with no abnormalities noted      No oral lesions, no thrush, oral mucosa moist   Neck:   No adenopathy, supple, trachea midline, no thyromegaly, no JVD       Lungs/Resp:     Normal effort, symmetric chest rise, no crepitus, clear to      auscultation bilaterally, no chest wall tenderness                 Heart/CV:    Regular rhythm and normal rate, normal S1 and S2, no            murmur   Abdomen/GI:     Soft, non-tender, non-distended, normal bowel sounds   :     Deferred   Extremities/MSK:   No clubbing or cyanosis.  No edema.  Normal tone.  No deformities.    Pulses:   Pulses palpable and equal bilaterally   Skin:   No bleeding, bruising or rash   Heme/Lymph:   No cervical or supraclavicular adenopathy.    Neurologic:    Psychiatric:       Moves all extremities with no obvious focal motor deficit.  Cranial nerves 2 - 12 grossly intact   Oriented x 3, normal affect.          The above findings are documentation of my personal physical examination from today.  Electronically signed by:  Cabrera Terry MD  08/22/23  15:32 " EDT      PFTs:  Pulmonary function testing was done on 8/22/2023.  Please see scanned PFT report for complete details.  FVC was 4.56 L or 107% predicted.  FEV1 was 3.89 L or 122% predicted.  FEV1 FVC ratio 85%.  Maximal voluntary ventilation 127 L/min or 104% predicted.  Total lung capacity 6.40 L or 105% predicted.  Residual volume 1.72 L or 74% predicted.  DLCO 101% predicted.    Interpretation: No obstruction.  Normal maximal voluntary ventilation.  No restriction.  No air trapping or hyperinflation.  Normal DLCO.  No prior study available for comparison.  Normal pulmonary function testing.    Imaging:  CT chest from 7/27/2023 was reviewed.  I reviewed both the images and the radiologist's report.  There is a 9 mm left lower lobe lung nodule (2 adjacent subsolid subpleural nodules per radiologist report), which appear new from prior CTA of the chest on Magy 15, 2021.  I reviewed both images.  I feel the lower nodule appears to be an essentially solid nodule.  I reviewed the images with the patient.    Assessment & Plan   Problems Addressed this Visit          Cardiac and Vasculature    SOB (shortness of breath) on exertion - Primary    Relevant Orders    Spirometry with Diffusion Capacity & Lung Volumes (Completed)       Pulmonary and Pneumonias    Nodule of lower lobe of left lung -9 mm on CT scan 7/27/2023.    Relevant Orders    NM PET/CT Skull Base to Mid Thigh       Tobacco    History of tobacco use, presenting hazards to health     Diagnoses         Codes Comments    SOB (shortness of breath) on exertion    -  Primary ICD-10-CM: R06.02  ICD-9-CM: 786.05     Nodule of lower lobe of left lung     ICD-10-CM: R91.1  ICD-9-CM: 793.11     History of tobacco use, presenting hazards to health     ICD-10-CM: Z87.891  ICD-9-CM: V15.82             Discussion:  67 y.o. former smoker 1/2 PPD for 40 years who quit 5 years ago is here for an abnormal chest CT with new  left lower lobe nodule.  Patient worked as a  /.  Nodule was detected on screening CT scan.  There is a prior history of a right lower lobe lung abscess which appears to have resolved.    I discussed further diagnostic options for the patient's new left lower lobe nodules with him.  These include surgical biopsy, attempt at CT-guided or bronchoscopic biopsy, which I do not feel will be safe given proximity to the diaphragm and small size of nodule, or PET CT scan.  After discussion, we decided on pursuing a PET CT scan.  If is positive we will refer patient for surgical resection/biopsy.  If it is negative we will repeat a CT scan in 6 months.    Plan:  1.  For left lower lobe 9 mm lung nodule: Obtain PET CT now.  If positive I will refer to surgery for surgical biopsy/resection.  If negative on PET scanning, repeat CT scan in 6 months.  2.  For history of tobacco use: Obtain PFTs to evaluate risk for possible surgical or diagnostic procedures.  Recommend screening CT scans.  Smoking cessation counseling done.  He has currently been a non-smoker for the past 5 years and was congratulated for his success and encouraged to remain abstinent from smoking.  3.  Follow-up after PET/CT with APRN or myself.         Immunization History   Administered Date(s) Administered    COVID-19 (PFIZER) Purple Cap Monovalent 2021, 2021    Influenza Quad Vaccine (Inpatient) 2017       Social History     Tobacco Use   Smoking Status Former    Packs/day: 0.50    Years: 40.00    Pack years: 20.00    Types: Cigarettes    Quit date: 2019    Years since quittin.6   Smokeless Tobacco Never        Shaun TAMY Tariq  reports that he quit smoking about 4 years ago. His smoking use included cigarettes. He has a 20.00 pack-year smoking history. He has never used smokeless tobacco.. I have educated him on the risk of diseases from using tobacco products such as cancer, COPD, and heart disease.     I advised him to quit and he is willing to quit. We have  discussed the following method/s for tobacco cessation: Patient has already quit.  Encouraged to remain abstinent.            Advance Care Planning     Address at follow-up and I will plan to offer a [place] MOST form to review and fill out if he desires.    Health maintenance:  Offer Prevnar 20 vaccine at follow-up.          I personally spent a total of 46 minutes on patient visit today including chart review, face to face with the patient obtaining the history and physical exam, review of pertinent images and tests, counseling and discussion and/or coordination of care as described above, and documentation.  Total time excludes time spent on other separate services such as performing procedures or test interpretation, if applicable.      Electronically signed by:  Cabrera Terry MD  08/22/23  15:32 EDT    Please note that portions of this note were completed with [employer] - a voice recognition program.

## 2023-08-30 ENCOUNTER — PATIENT ROUNDING (BHMG ONLY) (OUTPATIENT)
Dept: PULMONOLOGY | Facility: CLINIC | Age: 68
End: 2023-08-30
Payer: MEDICARE

## 2023-08-30 NOTE — PROGRESS NOTES
August 30, 2023    Hello, may I speak with Shaun Tariq?    My name is ZAN        I am  with [facility]  [address] [place] [id number] [phone].    Before we get started may I verify your date of birth? 1955    I am calling to officially welcome you to our practice and ask about your recent visit. Is this a good time to talk? yes    Tell me about your visit with us. What things went well?  YES THINGS WENT GOOD AND I REALLY APPRECIATE YOU GUYS        We're always looking for ways to make our patients' experiences even better. Do you have recommendations on ways we may improve?  no    Overall were you satisfied with your first visit to our practice? yes       I appreciate you taking the time to speak with me today. Is there anything else I can do for you? no      Thank you, and have a great day.

## 2023-09-12 ENCOUNTER — HOSPITAL ENCOUNTER (OUTPATIENT)
Dept: PET IMAGING | Facility: HOSPITAL | Age: 68
Discharge: HOME | End: 2023-09-12
Payer: MEDICARE

## 2023-09-12 DIAGNOSIS — R91.1 NODULE OF LOWER LOBE OF LEFT LUNG: ICD-10-CM

## 2023-09-12 LAB — GLUCOSE BLDC GLUCOMTR-MCNC: 116 MG/DL (ref 70–130)

## 2023-09-12 PROCEDURE — 78815 PET IMAGE W/CT SKULL-THIGH: CPT

## 2023-09-12 PROCEDURE — A9552 F18 FDG: HCPCS | Performed by: INTERNAL MEDICINE

## 2023-09-12 PROCEDURE — 0 FLUDEOXYGLUCOSE F18 SOLUTION: Performed by: INTERNAL MEDICINE

## 2023-09-12 PROCEDURE — 82948 REAGENT STRIP/BLOOD GLUCOSE: CPT

## 2023-09-12 RX ADMIN — FLUDEOXYGLUCOSE F18 1 DOSE: 300 INJECTION INTRAVENOUS at 11:28

## 2023-09-25 ENCOUNTER — TELEPHONE (OUTPATIENT)
Dept: PULMONOLOGY | Facility: CLINIC | Age: 68
End: 2023-09-25

## 2023-09-25 ENCOUNTER — PATIENT OUTREACH (OUTPATIENT)
Dept: ONCOLOGY | Facility: CLINIC | Age: 68
End: 2023-09-25

## 2023-09-25 DIAGNOSIS — Z87.891 HISTORY OF TOBACCO USE, PRESENTING HAZARDS TO HEALTH: Primary | ICD-10-CM

## 2023-09-25 NOTE — TELEPHONE ENCOUNTER
Reviewed patient's PET scan.  Nodule has resolved.  At this point return to screening CT scans once a year.  I will go ahead and put in a screening CT scan in 1 year.  Follow-up with me after that.  Patient is aware.  I had tried to call him previously and unable to reach him but he was reached by the lung nodule coordinator.

## 2023-10-27 ENCOUNTER — TRANSCRIBE ORDERS (OUTPATIENT)
Dept: ADMINISTRATIVE | Facility: HOSPITAL | Age: 68
End: 2023-10-27
Payer: MEDICARE

## 2023-10-27 DIAGNOSIS — R91.1 PULMONARY NODULE, LEFT: Primary | ICD-10-CM

## 2024-09-05 ENCOUNTER — OFFICE VISIT (OUTPATIENT)
Dept: PULMONOLOGY | Facility: CLINIC | Age: 69
End: 2024-09-05
Payer: MEDICARE

## 2024-09-05 VITALS
HEART RATE: 107 BPM | WEIGHT: 187 LBS | SYSTOLIC BLOOD PRESSURE: 140 MMHG | HEIGHT: 68 IN | TEMPERATURE: 98.1 F | DIASTOLIC BLOOD PRESSURE: 90 MMHG | BODY MASS INDEX: 28.34 KG/M2 | OXYGEN SATURATION: 99 %

## 2024-09-05 DIAGNOSIS — R91.8 MULTIPLE PULMONARY NODULES: ICD-10-CM

## 2024-09-05 DIAGNOSIS — Z87.891 HISTORY OF TOBACCO USE, PRESENTING HAZARDS TO HEALTH: ICD-10-CM

## 2024-09-05 DIAGNOSIS — R91.8 ABNORMAL CT SCAN OF LUNG: Primary | ICD-10-CM

## 2024-09-05 PROBLEM — R91.1 NODULE OF LOWER LOBE OF LEFT LUNG: Status: RESOLVED | Noted: 2023-08-22 | Resolved: 2024-09-05

## 2024-09-05 PROBLEM — J18.9 PNEUMONIA OF RIGHT LOWER LOBE DUE TO INFECTIOUS ORGANISM: Status: RESOLVED | Noted: 2021-06-15 | Resolved: 2024-09-05

## 2024-09-05 PROCEDURE — 99214 OFFICE O/P EST MOD 30 MIN: CPT | Performed by: NURSE PRACTITIONER

## 2024-09-05 NOTE — PROGRESS NOTES
"[facility] Pulmonary Follow up      Chief Complaint  Shortness of Breath    Subjective          Shaun Tariq presents to [facility] for follow-up on his CT scan the chest.  He is initially seen last year for an abnormal CT scan with a 9 mm nodule which resolved on a follow-up PET scan.  He had his routine annual low-dose screening CT scan on August 21 with some groundglass nodular opacities and 2 noncalcified nodules bilaterally at 4 mm.    He does have a history of smoking, he says he quit about 6 years ago.  He denies any significant shortness of breath.  No cough or sputum production.  No recent acute illnesses or exposures.    He is a retired , with no current exposures.          Objective     Vital Signs:   /90   Pulse 107   Temp 98.1 °F (36.7 °C)   Ht 172.7 cm (68\")   Wt 84.8 kg (187 lb)   SpO2 99% Comment: room air at rest  BMI 28.43 kg/m²       Immunization History   Administered Date(s) Administered    Arexvy (RSV, Adults 60+ yrs) 11/06/2023    COVID-19 (PFIZER) Purple Cap Monovalent 03/17/2021, 04/07/2021    COVID-19 F23 (PFIZER) 12YRS+ (COMIRNATY) 11/06/2023    Fluzone  >6mos 11/11/2017    Fluzone High-Dose 65+YRS 11/06/2023    Pneumococcal Conjugate 20-Valent (PCV20) 11/06/2023    Tdap 06/03/2024       Physical Exam  Vitals reviewed.   Constitutional:       Appearance: He is well-developed.   HENT:      Head: Normocephalic and atraumatic.   Eyes:      Pupils: Pupils are equal, round, and reactive to light.   Cardiovascular:      Rate and Rhythm: Normal rate and regular rhythm.      Heart sounds: No murmur heard.  Pulmonary:      Effort: Pulmonary effort is normal. No respiratory distress.      Breath sounds: Normal breath sounds. No wheezing or rales.   Abdominal:      General: Bowel sounds are normal. There is no distension.      Palpations: Abdomen is soft.   Musculoskeletal:         General: Normal range of motion.      Cervical " back: Normal range of motion and neck supple.   Skin:     General: Skin is warm and dry.      Findings: No erythema.   Neurological:      Mental Status: He is alert and oriented to person, place, and time.   Psychiatric:         Behavior: Behavior normal.          Result Review :       Data reviewed : Radiologic studies low-dose CT scan from [facility] on 8/21/2024      PFTs done in the office today:  Normal PFTs, no obstruction or restriction.                   Assessment and Plan    Problem List Items Addressed This Visit          Pulmonary and Pneumonias    Multiple pulmonary nodules    Abnormal CT scan of lung - Primary    Relevant Orders    CT Chest Low Dose Follow Up Without Contrast       Tobacco    History of tobacco use, presenting hazards to health     His PFTs are normal with no obstruction or restriction even with his smoking history.     We also reviewed his annual low-dose screening CT scan with some groundglass changes and to 4 mm nodules.  With his history we will go ahead and follow-up on a CT scan in 3 months    He had 2 left base nodules back in 2023, measuring around 9 mm, that resolved on follow-up.  The 3 mm right lower lobe nodule identified on his scan on August 21 at [facility] was present in July 2023 done at [facility].  The groundglass changes are new compared to last years.      Follow Up     No follow-ups on file.  Patient was given instructions and counseling regarding his condition or for health maintenance advice. Please see specific information pulled into the AVS if appropriate.     I spent 34 minutes caring for Shaun on this date of service. This time includes time spent by me in the following activities:preparing for the visit, reviewing tests, obtaining and/or reviewing a separately obtained history, performing a medically appropriate examination and/or evaluation , counseling and educating the patient/family/caregiver, ordering medications, tests, or procedures, referring and  communicating with other health care professionals , documenting information in the medical record, and independently interpreting results and communicating that information with the patient/family/caregiver    Excluding time spent on other separate services such as performing procedures or test interpretation, if applicable      UJAQUIN De León, ACNP  [facility]  Electronically signed

## 2024-11-25 ENCOUNTER — HOSPITAL ENCOUNTER (OUTPATIENT)
Dept: CT IMAGING | Facility: HOSPITAL | Age: 69
Discharge: HOME | End: 2024-11-25
Admitting: NURSE PRACTITIONER
Payer: MEDICARE

## 2024-11-25 DIAGNOSIS — R91.8 ABNORMAL CT SCAN OF LUNG: ICD-10-CM

## 2024-11-25 PROCEDURE — 71250 CT THORAX DX C-: CPT

## 2025-08-13 ENCOUNTER — TELEPHONE (OUTPATIENT)
Dept: CARDIOLOGY | Facility: CLINIC | Age: 70
End: 2025-08-13
Payer: MEDICARE